# Patient Record
Sex: FEMALE | Race: WHITE
[De-identification: names, ages, dates, MRNs, and addresses within clinical notes are randomized per-mention and may not be internally consistent; named-entity substitution may affect disease eponyms.]

---

## 2019-05-30 NOTE — DIAGNOSTIC IMAGING REPORT
EXAMINATION:  CHEST 2 VIEWS    



INDICATION: Pre-admit. 



COMPARISON:  Chest radiograph 1/25/2014. 

     

FINDINGS:

TUBES and LINES:  Left-sided AICD device with leads overlying the right atrium,

coronary sinus, and right ventricle.



LUNGS:  Lungs are well inflated. A 7 mm nodular opacity projects over the right

midlung.  There is no evidence of pneumonia or pulmonary edema. Mild patchy

right basilar opacity, likely atelectasis.



PLEURA:  No pleural effusion or pneumothorax. 



HEART AND MEDIASTINUM:  The cardiomediastinal silhouette is unremarkable. There

are atherosclerotic calcifications within the aorta.



BONES AND SOFT TISSUES:  No acute osseous abnormality. 



UPPER ABDOMEN: No free air under the diaphragm.    



IMPRESSION: 

No acute radiographic abnormality. 



A 7 mm nodular opacity projects over the right midlung. Suggest chest CT for

further evaluation.



Signed by: Dr. Maria Luisa Chacon MD on 5/30/2019 11:18 AM

## 2019-05-31 LAB
BASOPHILS # BLD AUTO: 0 10*3/UL (ref 0–0.1)
BASOPHILS NFR BLD AUTO: 0.8 % (ref 0–1)
DEPRECATED NEUTROPHILS # BLD AUTO: 3.2 10*3/UL (ref 2.1–6.9)
EOSINOPHIL # BLD AUTO: 0.2 10*3/UL (ref 0–0.4)
EOSINOPHIL NFR BLD AUTO: 3 % (ref 0–6)
ERYTHROCYTE [DISTWIDTH] IN CORD BLOOD: 13.5 % (ref 11.7–14.4)
HCT VFR BLD AUTO: 39 % (ref 34.2–44.1)
HGB BLD-MCNC: 12.9 G/DL (ref 12–16)
LYMPHOCYTES # BLD: 1.2 10*3/UL (ref 1–3.2)
LYMPHOCYTES NFR BLD AUTO: 24.7 % (ref 18–39.1)
MCH RBC QN AUTO: 29.6 PG (ref 28–32)
MCHC RBC AUTO-ENTMCNC: 33.1 G/DL (ref 31–35)
MCV RBC AUTO: 89.4 FL (ref 81–99)
MONOCYTES # BLD AUTO: 0.3 10*3/UL (ref 0.2–0.8)
MONOCYTES NFR BLD AUTO: 6 % (ref 4.4–11.3)
NEUTS SEG NFR BLD AUTO: 65.1 % (ref 38.7–80)
PLATELET # BLD AUTO: 165 X10E3/UL (ref 140–360)
RBC # BLD AUTO: 4.36 X10E6/UL (ref 3.6–5.1)

## 2019-06-03 ENCOUNTER — HOSPITAL ENCOUNTER (OUTPATIENT)
Dept: HOSPITAL 88 - OR | Age: 79
Setting detail: OBSERVATION
LOS: 1 days | Discharge: HOME | End: 2019-06-04
Attending: SPECIALIST | Admitting: SPECIALIST
Payer: MEDICARE

## 2019-06-03 VITALS — WEIGHT: 151.5 LBS | HEIGHT: 65 IN | BODY MASS INDEX: 25.24 KG/M2

## 2019-06-03 VITALS — SYSTOLIC BLOOD PRESSURE: 168 MMHG | DIASTOLIC BLOOD PRESSURE: 81 MMHG

## 2019-06-03 VITALS — SYSTOLIC BLOOD PRESSURE: 146 MMHG | DIASTOLIC BLOOD PRESSURE: 69 MMHG

## 2019-06-03 VITALS — SYSTOLIC BLOOD PRESSURE: 178 MMHG | DIASTOLIC BLOOD PRESSURE: 84 MMHG

## 2019-06-03 VITALS — DIASTOLIC BLOOD PRESSURE: 84 MMHG | SYSTOLIC BLOOD PRESSURE: 178 MMHG

## 2019-06-03 VITALS — SYSTOLIC BLOOD PRESSURE: 153 MMHG | DIASTOLIC BLOOD PRESSURE: 71 MMHG

## 2019-06-03 DIAGNOSIS — M17.11: Primary | ICD-10-CM

## 2019-06-03 DIAGNOSIS — Z01.812: ICD-10-CM

## 2019-06-03 DIAGNOSIS — Z01.811: ICD-10-CM

## 2019-06-03 DIAGNOSIS — Z82.49: ICD-10-CM

## 2019-06-03 DIAGNOSIS — Z83.3: ICD-10-CM

## 2019-06-03 DIAGNOSIS — M16.0: ICD-10-CM

## 2019-06-03 DIAGNOSIS — Z88.0: ICD-10-CM

## 2019-06-03 DIAGNOSIS — Z82.3: ICD-10-CM

## 2019-06-03 DIAGNOSIS — Z86.73: ICD-10-CM

## 2019-06-03 DIAGNOSIS — I10: ICD-10-CM

## 2019-06-03 DIAGNOSIS — Z79.82: ICD-10-CM

## 2019-06-03 DIAGNOSIS — E11.9: ICD-10-CM

## 2019-06-03 DIAGNOSIS — I25.10: ICD-10-CM

## 2019-06-03 PROCEDURE — 86920 COMPATIBILITY TEST SPIN: CPT

## 2019-06-03 PROCEDURE — 97162 PT EVAL MOD COMPLEX 30 MIN: CPT

## 2019-06-03 PROCEDURE — 86850 RBC ANTIBODY SCREEN: CPT

## 2019-06-03 PROCEDURE — 85018 HEMOGLOBIN: CPT

## 2019-06-03 PROCEDURE — 86900 BLOOD TYPING SEROLOGIC ABO: CPT

## 2019-06-03 PROCEDURE — 71046 X-RAY EXAM CHEST 2 VIEWS: CPT

## 2019-06-03 PROCEDURE — 85014 HEMATOCRIT: CPT

## 2019-06-03 PROCEDURE — 85025 COMPLETE CBC W/AUTO DIFF WBC: CPT

## 2019-06-03 PROCEDURE — 73560 X-RAY EXAM OF KNEE 1 OR 2: CPT

## 2019-06-03 PROCEDURE — 97139 UNLISTED THERAPEUTIC PX: CPT

## 2019-06-03 PROCEDURE — 27447 TOTAL KNEE ARTHROPLASTY: CPT

## 2019-06-03 PROCEDURE — 36415 COLL VENOUS BLD VENIPUNCTURE: CPT

## 2019-06-03 PROCEDURE — 97530 THERAPEUTIC ACTIVITIES: CPT

## 2019-06-03 PROCEDURE — 97116 GAIT TRAINING THERAPY: CPT

## 2019-06-03 RX ADMIN — Medication SCH MG: at 17:01

## 2019-06-03 RX ADMIN — ACETAMINOPHEN SCH MG: 10 INJECTION, SOLUTION INTRAVENOUS at 18:48

## 2019-06-03 RX ADMIN — SODIUM CHLORIDE SCH MLS/HR: 9 INJECTION, SOLUTION INTRAVENOUS at 19:24

## 2019-06-03 RX ADMIN — VANCOMYCIN HYDROCHLORIDE SCH MLS/HR: 1 INJECTION, SOLUTION INTRAVENOUS at 17:02

## 2019-06-03 RX ADMIN — ACETAMINOPHEN SCH MG: 10 INJECTION, SOLUTION INTRAVENOUS at 11:55

## 2019-06-03 RX ADMIN — CELECOXIB SCH MG: 100 CAPSULE ORAL at 17:02

## 2019-06-03 RX ADMIN — SODIUM CHLORIDE SCH MLS/HR: 9 INJECTION, SOLUTION INTRAVENOUS at 11:54

## 2019-06-03 RX ADMIN — ACETAMINOPHEN SCH MG: 10 INJECTION, SOLUTION INTRAVENOUS at 23:07

## 2019-06-03 NOTE — XMS REPORT
Oh Wilson MD

                             Created on: 2018



GISELLE DELGADO

External Reference #: [id number]

: 1940

Sex: Female



Demographics







                          Address                   [address] [place]  [address]

 

                          Home Phone                [phone]

 

                          Preferred Language        Unknown

 

                          Marital Status            Unknown

 

                          Alevism Affiliation     Unknown

 

                          Race                      Unknown

 

                          Ethnic Group              UNK





Author







                          Author                    Oh Wilson

 

                          Organization              [employer]

 

                          Address                   Unknown

 

                          Phone                     Unavailable







Care Team Providers







                    Care Team Member Name    Role                Phone

 

                    Oh Wilson     CP                  Unavailable



                                                                



Allergies

          No Known Allergies                                                    
                                   



Problems

          





             Problem Type    Condition    Code         Onset Dates    Condition Status

 

             Problem      Vitamin D deficiency    E55.9                     Active

 

             Problem      Primary osteoarthritis involving multiple joints    M15.0                     Active

 

             Problem      Pain in right knee    M25.561                   Active



                                                                                
                           



Medications

          No Known Medications                                                  
                           



Results

          No Known Results                                                      
             



Summary Purpose

          [employer] Submission

## 2019-06-03 NOTE — NUR
Rounding done & report received. Patient is resting in bed, awake & alert. Respirations even 
& unlabored, no distress noted. IV fluids running to L FA, no infiltration noted. Patient 
has Kerlex dressing to RLE, w/ heel placed on pillow, ice pack in place. Patient denies any 
issues or needs at this time. Call light within reach, bed set to lowest position & side 
rails x2 raised.

## 2019-06-03 NOTE — XMS REPORT
Oh Wilson MD

                             Created on: 2018



GISELLE DELGADO

External Reference #: [id number]

: 1940

Sex: Female



Demographics







                          Address                   [address] [place]  [address]

 

                          Home Phone                [phone]

 

                          Preferred Language        Unknown

 

                          Marital Status            Unknown

 

                          Voodoo Affiliation     Unknown

 

                          Race                      Unknown

 

                          Ethnic Group              UNK





Author







                          Author                    Oh Wilson

 

                          Organization              [employer]

 

                          Address                   Unknown

 

                          Phone                     Unavailable







Care Team Providers







                    Care Team Member Name    Role                Phone

 

                    Oh Wilson     CP                  Unavailable



                                                                



Allergies

          No Known Allergies                                                    
                                   



Problems

          





             Problem Type    Condition    Code         Onset Dates    Condition Status

 

             Problem      Vitamin D deficiency    E55.9                     Active

 

             Problem      Primary osteoarthritis involving multiple joints    M15.0                     Active

 

             Problem      Pain in right knee    M25.561                   Active



                                                                                
                           



Medications

          No Known Medications                                                  
                           



Results

          No Known Results                                                      
             



Summary Purpose

          [employer] Submission

## 2019-06-03 NOTE — XMS REPORT
Summary of Care: 7/10/14 - 7/10/14

                             Created on: 2042



GISELLE DELGADO

External Reference #: [id number]

: 1940

Sex: Female



Demographics







                          Address                   [address] [place] [address] [place]  [address]

 

                          Home Phone                [phone]

 

                          Preferred Language        English

 

                          Marital Status            Single

 

                          Restorationism Affiliation     Yazidism

 

                          Race                      White/

 

                          Ethnic Group              Non-





Author







                          Organization              Unknown

 

                          Address                   Unknown

 

                          Phone                     Unavailable







Encounter





HQ Rashardr_rambo(GINO) [id number] Date(s): 7/10/14 - 7/10/14

[facility] - [address] [address]-     [place] [phone]

Discharge Disposition: Home

Physician Attending: Juan Hernandez MD





Reason for Visit





715.00 - GENERAL OSTEOAR



Problem List





No data available for this section



Allergies, Adverse Reactions, Alerts





No data available for this section



Medications





No data available for this section



Medications Administered During Your Visit





No data available for this section



Immunizations





No data available for this section

## 2019-06-03 NOTE — XMS REPORT
Patient Summary Document

                             Created on: 2019



GISELLE DELGADO

External Reference #: [id number]

: 1940

Sex: Female



Demographics







                          Address                   [address] [place] [address]

 

                          Home Phone                [phone]

 

                          Preferred Language        Unknown

 

                          Marital Status            Unknown

 

                          Presybeterian Affiliation     Unknown

 

                          Race                      Unknown

 

                                        Additional Race(s)  

 

                          Ethnic Group              Unknown





Author







                          Author                    [facility]

 

                          [facility]

 

                          Address                   Unknown

 

                          Phone                     Unavailable







Care Team Providers







                    Care Team Member Name    Role                Phone

 

                    VIRGIL ARIZA     Unavailable         Unavailable







Problems

This patient has no known problems.



Allergies, Adverse Reactions, Alerts

This patient has no known allergies or adverse reactions.



Medications

This patient has no known medications.



Results







           Test Description    Test Time    Test Comments    Text Results    Atomic Results    Result

 Comments

 

                CHEST 2 VIEWS    2019 10:56:00                                                             

                                             [place] [address]  
   Patient Name: GISELLE DELGADO                                   MR #: 
[id number]                     : 1940                                  
Age/Sex: 79/F  Acct #: [id number]                              Req #: [id number]
[id number]  Adm Physician:                                                      
Ordered by: VIRGIL ARIZA MD                            Report #: [id number]    
   Location: OR                                      Room/Bed:                  
  
___________________________________________________________________________________________________
   Procedure: [id number] DX/CHEST 2 VIEWS  Exam Date: 19                   
        Exam Time: 1025                                              REPORT 
STATUS: Signed    EXAMINATION:  CHEST 2 VIEWS          INDICATION: Pre-admit.   
   COMPARISON:  Chest radiograph 2014.            FINDINGS:   TUBES and 
LINES:  Left-sided AICD device with leads overlying the right atrium,   coronary
sinus, and right ventricle.      LUNGS:  Lungs are well inflated. A 7 mm nodular
opacity projects over the right   midlung.  There is no evidence of pneumonia or
pulmonary edema. Mild patchy   right basilar opacity, likely atelectasis.      
PLEURA:  No pleural effusion or pneumothorax.       HEART AND MEDIASTINUM:  The 
cardiomediastinal silhouette is unremarkable. There   are atherosclerotic 
calcifications within the aorta.      BONES AND SOFT TISSUES:  No acute osseous 
abnormality.       UPPER ABDOMEN: No free air under the diaphragm.          
IMPRESSION:    No acute radiographic abnormality.       A 7 mm nodular opacity 
projects over the right midlung. Suggest chest CT for   further evaluation.     
Signed by: Dr. Sunita Packer MD on 2019 11:18 AM        Dictated By: SUNITA PACKER MD  Electronically Signed By: SUNITA PACKER MD on 19 1118  Transcribed 
By: KEVIN on 19 1118       COPY TO:   VIRGIL ARIZA MD

## 2019-06-03 NOTE — XMS REPORT
Summary of Care: 3/15/17 - 3/15/17

                             Created on: 2095



DANNYGISELLE

External Reference #: [id number]

: 1940

Sex: Female



Demographics







                          Address                   [address] [place] [address] [place]  [address]

 

                          Home Phone                [phone]

 

                          Preferred Language        English

 

                          Marital Status            Single

 

                          Sabianist Affiliation     Samaritan

 

                          Race                      White/

 

                          Ethnic Group              Non-





Author







                          Author                    [facility]

 

                          Organization              [facility]

 

                          Address                   Unknown

 

                          Phone                     Unavailable







Encounter





HQ Encntr_alias(FIN) [id number] Date(s): 3/15/17 - 3/15/17

[facility] [address] [place] [address]      [phone]
[phone]

Discharge Disposition: Home or Self Care

Attending Physician: Coy Perez MD





Vital Signs





No data available for this section



Problem List





No data available for this section



Allergies, Adverse Reactions, Alerts





No data available for this section



Medications





No data available for this section



Results





No data available for this section



Immunizations





No data available for this section



Procedures





No data available for this section



Social History





No data available for this section



Assessment and Plan





No data available for this section

## 2019-06-03 NOTE — XMS REPORT
Oh Wilson MD

                             Created on: 2018



GISELLE DELGADO

External Reference #: [id number]

: 1940

Sex: Female



Demographics







                          Address                   [address] 

[place]  [address]

 

                          Home Phone                [phone]

 

                          Preferred Language        Unknown

 

                          Marital Status            Unknown

 

                          Christianity Affiliation     Unknown

 

                          Race                      Unknown

 

                          Ethnic Group              UNK





Author







                          Author                    Oh Wilson

 

                          Organization              [employer]

 

                          Address                   Unknown

 

                          Phone                     Unavailable







Care Team Providers







                    Care Team Member Name    Role                Phone

 

                    Oh Wilson     CP                  Unavailable



                                                                



Allergies, Adverse Reactions, Alerts

          





                    Substance           Reaction            Event Type

 

                    penicillin          Info Not Available    Drug Allergy



                                                                                
       



Problems

          





             Problem Type    Condition    Code         Onset Dates    Condition Status

 

             Problem      Vitamin D deficiency    E55.9                     Active

 

             Problem      Primary osteoarthritis involving multiple joints    M15.0                     Active

 

             Problem      Pain in right knee    M25.561                   Active

 

             Assessment    Pain in right knee    M25.561                   Active

 

             Assessment    Primary osteoarthritis involving multiple joints    M15.0                     Active



                                                                                
                                               



Medications

          





        Medication    Code System    Code    Instructions    Start Date    End Date    Status    Dosage



 

        Centrum    NDC     08231899479    - Orally                     Active    as directed

 

        Osteo Bi-Flex Adv Double St    NDC     42745824656    - Orally                     Active    as directed



 

        Losartan Potassium    NDC     61936539632    50 MG Orally Once a day                    Active    1 tablet



 

        Pantoprazole Sodium    NDC     10594065796    40 MG Orally Once a day                    Active    1 

tablet

 

          Tylenol Extra Strength    NDC       27837367112    500 MG Orally every 6 hrs                        Active

                                        1 tablet as needed

 

        Lisinopril    NDC     96043845224    2.5 MG Orally Once a day                    Active    1 tablet

 

        Metoprolol Tartrate    NDC     41784160977    25 MG Orally Twice a day                    Active    1

 tablet with food

 

        Pravastatin Sodium    NDC     39590563995    10 MG Orally Once a day                    Active    1 tablet





                                                                                
                                                                                
      



Vital Signs

          





                          Date/Time:                Dec 06, 2018

 

                          BMI                       25.22 Index

 

                          Weight                    151.6 lbs

 

                          Height                    65 in

 

                          Cardiac Monitoring Heart Rate    74 /min

 

                          Blood Pressure Diastolic    80 mm Hg

 

                          Blood Pressure Systolic    130 mm Hg



                                                                              



Results

          No Known Results                                                      
             



Summary Purpose

          [employer] Submission

## 2019-06-03 NOTE — DIAGNOSTIC IMAGING REPORT
Right knee radiographs-2 views



History: Postoperative



Findings: Status post right total knee arthroplasty and patellar resurfacing

with prosthetic components in anatomic alignment.  Overlying subcutaneous

emphysema and surgical skin staples are present. No evidence of acute fracture

or malalignment.



IMPRESSION:

Status post right total knee arthroplasty and anatomic position.



Signed by: Dr. Maria Luisa Chacon MD on 6/3/2019 12:22 PM

## 2019-06-03 NOTE — OPERATIVE REPORT
DATE OF PROCEDURE:  06/03/2019

 

SURGEON:  Kobi Mejía MD

 

ASSISTANT:  Kuldeep Pineda

 

PREOPERATIVE DIAGNOSIS:  Osteoarthritis, right knee.

 

POSTOPERATIVE DIAGNOSIS:  Osteoarthritis, right knee.

 

PROCEDURE:  Right total knee arthroplasty.

 

INDICATIONS:  The patient is an active 79-year-old woman, who has end-stage arthritis in

her right knee.  She has failed conservative management and would like to proceed with a

right total knee replacement.  The risks and benefits of the procedure have been

discussed.  The added challenges to recover from the surgery due to her age have been

discussed.  She states she understands and wishes to proceed procedure. 

 

DESCRIPTION OF PROCEDURE:  The patient was brought to the operating room and placed

under general anesthetic.  She received prophylactic antibiotics, a regional block and

tranexamic acid in the holding area.  Her right lower extremity was prepped and draped

in a sterile manner.  A preoperative time-out was performed.  The extremity was

exsanguinated and a proximal tourniquet was inflated to 300 mmHg.  An anterior approach

to the right knee was made.  Her skin was very thin.  A medial parapatellar arthrotomy

was performed.  Clear synovial fluid was removed from the joint.  Soft tissue releases

were performed to bring the knee up into flexion with the patella everted.  Meniscal

remnants and marginal osteophytes were removed.  The cruciate ligaments were sacrificed.

 A [employer] and [employer] knee system was used with ultracongruent tibial inserts.  An

extramedullary cutting guide was used to resect the proximal tibia.  The cut was

referenced off the least affected lateral compartment.  The tibial base plate was a size

#4.  The central fin punch was impacted and attention was directed towards the distal

femur.  An intramedullary cutting guide was used to resect the distal femur in 6 degrees

of valgus and rotation referencing off a combination of landmarks including Whitesides

line, the epicondylar axis and the posterior condyles.  The femoral component was size

#5.  The anterior and posterior cuts were made.  Trial reduction was performed.  A 9 mm

ultracongruent tibial insert provided appropriate soft tissue balancing in both flexion

and extension.  The patella was resurfaced with a 29 mm x 7.5 mm patellar button.  The

thickness was checked before and after resurfacing, was right around 22 mm.  Patellar

tracking was noted to be concentric.  The trial implants were removed.  A 100 mL

premixed pericapsular FREDY injection was placed into the surrounding soft tissue.  The

knee was thoroughly irrigated with a shower tip pulsatile lavage.  The components were

cemented into place using a single mix of Palacos cement preloaded with antibiotics.

Care was taken to remove all extravasated cement.  The wound was further irrigated,

while the cement cured.  Vancomycin powder 500 mg was then placed into the joint.  The

arthrotomy was carefully closed with interrupted #1 Ethibond.  The knee was put through

flexion and extension to ensure a secure closure.  The skin was carefully closed with

subcuticular Vicryl and staples.  As noted previously, the skin was very thin.  A

sterile bandage was applied.  The 

patient was extubated and transported to the recovery room in stable condition.  Blood

loss was minimal.  All needle and sponge counts were correct. 

 

 

 

 

______________________________

Kobi Mejía MD DR/QUINTIN

D:  06/03/2019 10:35:45

T:  06/03/2019 17:25:56

Job #:  [id number]

## 2019-06-04 VITALS — DIASTOLIC BLOOD PRESSURE: 84 MMHG | SYSTOLIC BLOOD PRESSURE: 178 MMHG

## 2019-06-04 VITALS — DIASTOLIC BLOOD PRESSURE: 73 MMHG | SYSTOLIC BLOOD PRESSURE: 162 MMHG

## 2019-06-04 VITALS — SYSTOLIC BLOOD PRESSURE: 162 MMHG | DIASTOLIC BLOOD PRESSURE: 73 MMHG

## 2019-06-04 VITALS — SYSTOLIC BLOOD PRESSURE: 167 MMHG | DIASTOLIC BLOOD PRESSURE: 76 MMHG

## 2019-06-04 VITALS — DIASTOLIC BLOOD PRESSURE: 82 MMHG | SYSTOLIC BLOOD PRESSURE: 194 MMHG

## 2019-06-04 VITALS — DIASTOLIC BLOOD PRESSURE: 71 MMHG | SYSTOLIC BLOOD PRESSURE: 187 MMHG

## 2019-06-04 LAB
HCT VFR BLD AUTO: 30.6 % (ref 34.2–44.1)
HGB BLD-MCNC: 10.1 G/DL (ref 12–16)

## 2019-06-04 RX ADMIN — CELECOXIB SCH MG: 100 CAPSULE ORAL at 08:50

## 2019-06-04 RX ADMIN — Medication SCH MG: at 08:50

## 2019-06-04 RX ADMIN — ACETAMINOPHEN SCH MG: 10 INJECTION, SOLUTION INTRAVENOUS at 08:50

## 2019-06-04 RX ADMIN — SODIUM CHLORIDE SCH MLS/HR: 9 INJECTION, SOLUTION INTRAVENOUS at 04:30

## 2019-06-04 RX ADMIN — VANCOMYCIN HYDROCHLORIDE SCH MLS/HR: 1 INJECTION, SOLUTION INTRAVENOUS at 04:33

## 2019-06-04 NOTE — CONSULTATION
DATE OF CONSULTATION:    

 

CHIEF COMPLAINT:  79-year-old female, status post right knee open arthroplasty.

 

HISTORY OF PRESENT ILLNESS:  This is an 79-year-old woman with a history of coronary

artery disease, was operated yesterday for right knee replacement.  The patient is

currently stable.  Pain is controlled.  No chest pain noted.  The patient has history of

CAD.  The patient risks was considered as average cardiac risks.  The patient underwent

surgery. 

 

MEDICATIONS:  At this time include aspirin, lisinopril 2.5, losartan 100 mg

pantoprazole, pravastatin, and Tylenol as needed.  Currently, the patient is also

received Ketorolac, promethazine, and zolpidem for sleep. 

 

PAST MEDICAL HISTORY:  As mentioned above, hypertension, CAD, hyperlipidemia, and

history of osteoarthritis. 

 

PAST SURGICAL HISTORY:  Noncontributory.  The patient did have recent stress test which

was nondiagnostic.  LV function was abnormal.  The patient was cleared for surgery. 

 

REVIEW OF SYSTEMS:

At this time.  No chest pain or shortness of breath.  No nausea, vomiting, or diarrhea.

No constipation.  No rectal bleeding.  Positive for pain in the right lower extremity. 

 

PHYSICAL EXAMINATION:

VITAL SIGNS:  Temperature 97.3, pulse of 79, respirations 18, and blood pressure is

167/76. 

HEENT:  Normocephalic and atraumatic.  No pallor noted.  No icterus noted. 

CVS:  S1 and S2 normal.  Regular rate and rhythm. 

ABDOMEN:  Nontender and nondistended. 

EXTREMITIES:  No clubbing, no cyanosis, no edema.  Right knee in a CPM.

ASSESSMENT:  79-year-old female with coronary artery disease, status post right knee

arthroplasty. 

 

PLAN:  Plan is to continue on her beta-blockade and ACE inhibitors with a history of LV

function dysfunction and beta-blockade, continue with this.  Aspirin is instituted for

DVT prophylaxis.  Medications were reviewed.  Statins will be reinstated and further

recommendation per clinical course.  We will continue monitoring the patient and

continue with postoperative medications and orders as per Orthopedics. 

 

 

 

 

______________________________

MD GALEN Dennis/MODL

D:  06/04/2019 06:18:03

T:  06/04/2019 07:07:54

Job #:  [id number]

## 2019-06-04 NOTE — NUR
EDWINA called and spoke with Kerri with intake at [facility]. Informed her of anticipated 
discharge for today. She said they will be able to see pt tomorrow. 

Phone [phone]

Fax [phone]



Called and spoke to Dionne at [employer]. She states they have spoken to pt. She 
already has a walker and refused [employer]. They will deliver the CPM today.

Phone [phone]



Both company's contact information was printed and given to pt.

## 2019-06-30 ENCOUNTER — HOSPITAL ENCOUNTER (INPATIENT)
Dept: HOSPITAL 88 - ER | Age: 79
LOS: 4 days | Discharge: HOME | DRG: 947 | End: 2019-07-04
Attending: FAMILY MEDICINE | Admitting: FAMILY MEDICINE
Payer: MEDICARE

## 2019-06-30 VITALS — BODY MASS INDEX: 23.72 KG/M2 | HEIGHT: 67 IN | WEIGHT: 151.13 LBS

## 2019-06-30 VITALS — DIASTOLIC BLOOD PRESSURE: 78 MMHG | SYSTOLIC BLOOD PRESSURE: 174 MMHG

## 2019-06-30 VITALS — DIASTOLIC BLOOD PRESSURE: 123 MMHG | SYSTOLIC BLOOD PRESSURE: 157 MMHG

## 2019-06-30 VITALS — SYSTOLIC BLOOD PRESSURE: 168 MMHG | DIASTOLIC BLOOD PRESSURE: 74 MMHG

## 2019-06-30 VITALS — SYSTOLIC BLOOD PRESSURE: 100 MMHG | DIASTOLIC BLOOD PRESSURE: 78 MMHG

## 2019-06-30 VITALS — SYSTOLIC BLOOD PRESSURE: 166 MMHG | DIASTOLIC BLOOD PRESSURE: 87 MMHG

## 2019-06-30 VITALS — SYSTOLIC BLOOD PRESSURE: 171 MMHG | DIASTOLIC BLOOD PRESSURE: 77 MMHG

## 2019-06-30 DIAGNOSIS — F02.80: ICD-10-CM

## 2019-06-30 DIAGNOSIS — N17.9: ICD-10-CM

## 2019-06-30 DIAGNOSIS — G92: ICD-10-CM

## 2019-06-30 DIAGNOSIS — R41.82: Primary | ICD-10-CM

## 2019-06-30 DIAGNOSIS — F41.9: ICD-10-CM

## 2019-06-30 DIAGNOSIS — Z96.651: ICD-10-CM

## 2019-06-30 DIAGNOSIS — I11.0: ICD-10-CM

## 2019-06-30 DIAGNOSIS — R74.0: ICD-10-CM

## 2019-06-30 DIAGNOSIS — R73.03: ICD-10-CM

## 2019-06-30 DIAGNOSIS — E78.5: ICD-10-CM

## 2019-06-30 DIAGNOSIS — Z88.0: ICD-10-CM

## 2019-06-30 DIAGNOSIS — R47.89: ICD-10-CM

## 2019-06-30 DIAGNOSIS — G30.9: ICD-10-CM

## 2019-06-30 DIAGNOSIS — Z95.0: ICD-10-CM

## 2019-06-30 DIAGNOSIS — D64.9: ICD-10-CM

## 2019-06-30 DIAGNOSIS — I50.9: ICD-10-CM

## 2019-06-30 LAB
ALBUMIN SERPL-MCNC: 4.1 G/DL (ref 3.5–5)
ALBUMIN/GLOB SERPL: 1.2 {RATIO} (ref 0.8–2)
ALP SERPL-CCNC: 64 IU/L (ref 40–150)
ALT SERPL-CCNC: 13 IU/L (ref 0–55)
ANION GAP SERPL CALC-SCNC: 21.2 MMOL/L (ref 8–16)
BACTERIA URNS QL MICRO: (no result) /HPF
BASOPHILS # BLD AUTO: 0 10*3/UL (ref 0–0.1)
BASOPHILS NFR BLD AUTO: 0.5 % (ref 0–1)
BILIRUB UR QL: NEGATIVE
BUN SERPL-MCNC: 14 MG/DL (ref 7–26)
BUN/CREAT SERPL: 10 (ref 6–25)
CALCIUM SERPL-MCNC: 11.4 MG/DL (ref 8.4–10.2)
CHLORIDE SERPL-SCNC: 98 MMOL/L (ref 98–107)
CK MB SERPL-MCNC: 0.8 NG/ML (ref 0–5)
CK MB SERPL-MCNC: 1.1 NG/ML (ref 0–5)
CK SERPL-CCNC: 34 IU/L (ref 29–168)
CK SERPL-CCNC: 44 IU/L (ref 29–168)
CLARITY UR: CLEAR
CO2 SERPL-SCNC: 25 MMOL/L (ref 22–29)
COLOR UR: YELLOW
DEPRECATED APTT PLAS QN: 33.4 SECONDS (ref 23.8–35.5)
DEPRECATED INR PLAS: 1.07
DEPRECATED NEUTROPHILS # BLD AUTO: 6.4 10*3/UL (ref 2.1–6.9)
DEPRECATED RBC URNS MANUAL-ACNC: (no result) /HPF (ref 0–5)
EGFRCR SERPLBLD CKD-EPI 2021: 37 ML/MIN (ref 60–?)
EOSINOPHIL # BLD AUTO: 0.1 10*3/UL (ref 0–0.4)
EOSINOPHIL NFR BLD AUTO: 1.2 % (ref 0–6)
EPI CELLS URNS QL MICRO: (no result) /LPF
ERYTHROCYTE [DISTWIDTH] IN CORD BLOOD: 13.1 % (ref 11.7–14.4)
GLOBULIN PLAS-MCNC: 3.5 G/DL (ref 2.3–3.5)
GLUCOSE SERPLBLD-MCNC: 107 MG/DL (ref 74–118)
HCT VFR BLD AUTO: 35.8 % (ref 34.2–44.1)
HGB BLD-MCNC: 11.9 G/DL (ref 12–16)
KETONES UR QL STRIP.AUTO: NEGATIVE
LEUKOCYTE ESTERASE UR QL STRIP.AUTO: NEGATIVE
LYMPHOCYTES # BLD: 1.5 10*3/UL (ref 1–3.2)
LYMPHOCYTES NFR BLD AUTO: 17.4 % (ref 18–39.1)
MCH RBC QN AUTO: 29 PG (ref 28–32)
MCHC RBC AUTO-ENTMCNC: 33.2 G/DL (ref 31–35)
MCV RBC AUTO: 87.1 FL (ref 81–99)
MONOCYTES # BLD AUTO: 0.6 10*3/UL (ref 0.2–0.8)
MONOCYTES NFR BLD AUTO: 6.4 % (ref 4.4–11.3)
NEUTS SEG NFR BLD AUTO: 74 % (ref 38.7–80)
NITRITE UR QL STRIP.AUTO: NEGATIVE
NON-SQ EPI CELLS URNS QL MICRO: (no result)
PLATELET # BLD AUTO: 232 X10E3/UL (ref 140–360)
POTASSIUM SERPL-SCNC: 4.2 MMOL/L (ref 3.5–5.1)
PROT UR QL STRIP.AUTO: NEGATIVE
PROTHROMBIN TIME: 14.4 SECONDS (ref 11.9–14.5)
RBC # BLD AUTO: 4.11 X10E6/UL (ref 3.6–5.1)
SODIUM SERPL-SCNC: 140 MMOL/L (ref 136–145)
SP GR UR STRIP: <=1.005 (ref 1.01–1.02)
UROBILINOGEN UR STRIP-MCNC: 0.2 MG/DL (ref 0.2–1)
WBC #/AREA URNS HPF: (no result) /HPF (ref 0–5)

## 2019-06-30 PROCEDURE — 85025 COMPLETE CBC W/AUTO DIFF WBC: CPT

## 2019-06-30 PROCEDURE — 87040 BLOOD CULTURE FOR BACTERIA: CPT

## 2019-06-30 PROCEDURE — 82553 CREATINE MB FRACTION: CPT

## 2019-06-30 PROCEDURE — 81001 URINALYSIS AUTO W/SCOPE: CPT

## 2019-06-30 PROCEDURE — 99284 EMERGENCY DEPT VISIT MOD MDM: CPT

## 2019-06-30 PROCEDURE — 80048 BASIC METABOLIC PNL TOTAL CA: CPT

## 2019-06-30 PROCEDURE — 71260 CT THORAX DX C+: CPT

## 2019-06-30 PROCEDURE — 93005 ELECTROCARDIOGRAM TRACING: CPT

## 2019-06-30 PROCEDURE — 70496 CT ANGIOGRAPHY HEAD: CPT

## 2019-06-30 PROCEDURE — 85730 THROMBOPLASTIN TIME PARTIAL: CPT

## 2019-06-30 PROCEDURE — 93970 EXTREMITY STUDY: CPT

## 2019-06-30 PROCEDURE — 82607 VITAMIN B-12: CPT

## 2019-06-30 PROCEDURE — 94640 AIRWAY INHALATION TREATMENT: CPT

## 2019-06-30 PROCEDURE — 85379 FIBRIN DEGRADATION QUANT: CPT

## 2019-06-30 PROCEDURE — 82550 ASSAY OF CK (CPK): CPT

## 2019-06-30 PROCEDURE — 82140 ASSAY OF AMMONIA: CPT

## 2019-06-30 PROCEDURE — 87086 URINE CULTURE/COLONY COUNT: CPT

## 2019-06-30 PROCEDURE — 97139 UNLISTED THERAPEUTIC PX: CPT

## 2019-06-30 PROCEDURE — 93306 TTE W/DOPPLER COMPLETE: CPT

## 2019-06-30 PROCEDURE — 70498 CT ANGIOGRAPHY NECK: CPT

## 2019-06-30 PROCEDURE — 84443 ASSAY THYROID STIM HORMONE: CPT

## 2019-06-30 PROCEDURE — 86592 SYPHILIS TEST NON-TREP QUAL: CPT

## 2019-06-30 PROCEDURE — 70450 CT HEAD/BRAIN W/O DYE: CPT

## 2019-06-30 PROCEDURE — 83605 ASSAY OF LACTIC ACID: CPT

## 2019-06-30 PROCEDURE — 83880 ASSAY OF NATRIURETIC PEPTIDE: CPT

## 2019-06-30 PROCEDURE — 74230 X-RAY XM SWLNG FUNCJ C+: CPT

## 2019-06-30 PROCEDURE — 36415 COLL VENOUS BLD VENIPUNCTURE: CPT

## 2019-06-30 PROCEDURE — 84484 ASSAY OF TROPONIN QUANT: CPT

## 2019-06-30 PROCEDURE — 85610 PROTHROMBIN TIME: CPT

## 2019-06-30 PROCEDURE — 80053 COMPREHEN METABOLIC PANEL: CPT

## 2019-06-30 PROCEDURE — 71045 X-RAY EXAM CHEST 1 VIEW: CPT

## 2019-06-30 RX ADMIN — Medication SCH ML: at 19:55

## 2019-06-30 RX ADMIN — LORAZEPAM PRN MG: 2 INJECTION INTRAMUSCULAR; INTRAVENOUS at 19:45

## 2019-06-30 RX ADMIN — CEFEPIME HYDROCHLORIDE SCH MLS/HR: 1 INJECTION, POWDER, FOR SOLUTION INTRAMUSCULAR; INTRAVENOUS at 18:04

## 2019-06-30 NOTE — DIAGNOSTIC IMAGING REPORT
EXAM: CT Chest with contrast- Pulmonary Embolism Protocol



INDICATION: Shortness of breath, post operative. 



COMPARISON: Chest radiograph 6/30/2019.



TECHNIQUE:

Chest was scanned utilizing a multidetector helical scanner from the lung apex

through the level of the diaphragm after administration of IV contrast. Thin

section reconstructions were obtained with special concentration on the

pulmonary arteries. Coronal and sagittal reformations were obtained. Pulmonary

embolism protocol was performed.

           IV CONTRAST: 100 cc of Isovue 370

           RADIATION DOSE: Total DLP: 470.8 mGy*cm

            

Dose modulation, iterative reconstruction, and/or weight based adjustment of

the mA/kV was utilized to reduce the radiation dose to as low as reasonably

achievable. 



           COMPLICATIONS: None



FINDINGS:



LINES/ TUBES: There is a left-sided AICD device with leads terminating in the

right atrium, coronary sinus, and right ventricle.



PULMONARY ARTERIES: No filling defect is identified within the pulmonary

arteries to the segmental level. The subsegmental pulmonary arteries are not

well opacified, with limited evaluation particularly in the lower lungs

secondary to motion. Main pulmonary artery measures 2.3 cm in diameter.



LUNGS AND AIRWAYS: The central airways are patent. Limited evaluation secondary

to motion artifact. There is a 6 mm solid nodule in the right middle lobe on

series 3, image 57 and a 7 mm subpleural solid nodule in the right lower lobe

on image 52. There is a 3 mm solid nodule in the left upper lobe on image 21.

Mild biapical pleural-parenchymal opacity. 



PLEURA: The pleural spaces are clear.



HEART AND MEDIASTINUM: The thyroid gland is normal.  No mediastinal, hilar or

axillary lymphadenopathy. No cardiomegaly or pericardial effusion. Scattered

coronary and aortic atherosclerosis. Diffuse mild esophageal wall thickening.

Small hiatal hernia. The left vertebral artery arises directly from the

thoracic aorta.



UPPER ABDOMEN: Limited contrast-enhanced views of the upper abdomen. There is a

5.4 cm cyst in the right hepatic lobe (series 2, image 91; 18 HU).



BONES: No acute osseous abnormality. No suspicious lytic or blastic lesions.

Degenerative changes of the visualized spine.



SOFT TISSUES: Unremarkable.



IMPRESSION: 

No evidence of pulmonary embolism to the level of the segmental pulmonary

arteries.



Bilateral pulmonary nodules, measuring up to 7 mm in the right lower lobe.

Follow-up chest CT is recommended in 3-6 months.



Diffuse mild esophageal wall thickening which may represent esophagitis. If

clinically indicated, follow-up EGD may be considered.



Signed by: Dr. Maria Luisa Chacon MD on 6/30/2019 5:35 PM

## 2019-06-30 NOTE — DIAGNOSTIC IMAGING REPORT
History:Altered mental status



Comparison studies:

None



Technique:

Axial images were obtained from the skull base to the vertex.

Coronal and sagittal images reconstructed from the axial data.

Dose modulation, iterative reconstruction, and/or weight based adjustment 

of the mA/kV was utilized to reduce the radiation dose to as low as reasonably 

achievable.



Intravenous contrast: None



Findings:



Scalp/skull: 

No abnormalities.



Extra-axial spaces: 

No masses.  No fluid collections.



Brain sulci: Mildly prominent.

Ventricles: Mild compensatory dilatation. No hydrocephalus.



Parenchyma: 

Subtle hypodensities in the supratentorial white matter are small vessel

ischemic changes. 

No masses, hemorrhage, acute or chronic cortical vascular insults.



Sellar/suprasellar region: No abnormalities.

Craniocervical junction: Patent foramen magnum.  No Chiari one malformation.



Incidental findings:

Atherosclerotic calcifications in the carotid siphons and vertebral arteries.



Impression:



No acute abnormalities.



Chronic findings:

1.  Mild age-related generalized volume loss.

2.  Mild supratentorial white matter small vessel ischemic changes.



Signed by: Dr. Chele Mcdaniels M.D. on 6/30/2019 3:11 PM

## 2019-06-30 NOTE — XMS REPORT
Continuity of Care Document

                             Created on: 2019



GISELLE DELGADO

External Reference #: [id number]

: 1940

Sex: Female



Demographics







                          Address                   [address] [place]  [address]

 

                          Home Phone                [phone]

 

                          Preferred Language        English

 

                          Marital Status            Unknown

 

                          Gnosticist Affiliation     Unknown

 

                          Race                      Unknown

 

                          Ethnic Group              Unknown





Author







                          Author                    [employer]

 

                          Address                   Unknown

 

                          Phone                     Unavailable







Care Team Providers







                    Care Team Member Name    Role                Phone

 

                    [employer]    Unavailable         Unavailable



                                    



Problems

                    





                    Problem                            Status                            Onset Date     

                          Classification                            Date Reported       

                          Comments                            Source                    

 

                          R94.01 - ABNORMAL ELECTROENCEPHALOGRAM                            Active          

                    2017                                                           

                                                       [facility]                

    

 

                          V76.12 - SCREEN MAMMOGRA                            Active                        

                    2012                                                                         

                                                       [facility]                    

 

                    ROUTINE                            Active                            11/10/2011     

                                                                                       

                                        [facility]                    

 

                    Vitamin D deficiency                            Active                              

                          Problem                            12/15/2018              

                                                      Raymond Wilson                    

 

                          Primary osteoarthritis involving multiple joints                            Active

                                                        Problem                      

                    12/15/2018                                                        Raymond Wilson 

                   

 

                    Pain in right knee                            Active                                

                          Problem                            12/15/2018                

                                                      Raymond Wilson                    

 

                    SCREEN MAMMOGRAM NEC                            Active                              

                                                                                       

                                        [facility]                    



                                                                                
                                                                                
      



Medications

                    





                    Medication                            Details                            Route      

                          Status                            Patient Instructions         

                          Ordering Provider                            Order Date           

                                        Source                    

 

                    Centrum                            as directed                            Orally    

                          Active                            - Orally                   

                    Steve Wilson  

                  

 

                          Osteo Bi-Flex Adv Double St                            as directed                

                    Orally                            Active                            - Orally

                            Steve Wilson                    

 

                    Losartan Potassium                            1 tablet                            Orally

                            Active                            50 MG Orally Once a day

                            [place]                                                   

                                        Raymond Wilson                    

 

                    Pantoprazole Sodium                            1 tablet                            Orally

                            Active                            40 MG Orally Once a day

                            [facility]                                                   

                                        Raymond Wilson                    

 

                          Tylenol Extra Strength                            1 tablet as needed              

                    Orally                            Active                            500 MG

 Orally every 6 hrs                            Steve Wilson                    

 

                    Lisinopril                            1 tablet                            Orally    

                          Active                            2.5 MG Orally Once a day   

                         [facility]                                                        

Raymond Wilson                    

 

                          Metoprolol Tartrate                            1 tablet with food                 

                    Orally                            Active                            25 MG Orally

 Twice a day                            [place]                                       

                                        Raymond Wilson                    

 

                    Pravastatin Sodium                            1 tablet                            Orally

                            Active                            10 MG Orally Once a day

                            [facility]                                                   

                                        Raymond Wilson                    



                                                                                
                                                                                
                                  



Allergies, Adverse Reactions, Alerts

                    





                    Substance                            Category                            Reaction   

                          Severity                            Reaction type           

                          Status                            Date Reported                     

                          Comments                            Source                    

 

                    penicillin                            Adverse Reaction                            Info

 Not Available                                                        Adverse Reaction

                            Active                            2018             

                                                      Raymond Wilson                    



                                                        



Immunizations

        





                                        No Data Provided for This Section



                                     



Results







                                        No Data Provided for This Section



                    



Pathology Reports







                                        No Data Provided for This Section                    



                                                



Diagnostic Reports

                    





                    Report                            Value                            Date             

                                        Source                    

 

                          Chest w/wo contrast CT                            Exam: CT scan of the chest with 

and without contrast

Reason for Exam: Abnormal echocardiogram

Comparison Exam: None available

Technique: Multiple axial images were obtained of the chest. 3.75 mm slices were
acquired with and without injection of 100 cc Omnipaque IV. In addition, 
reformatted sagittal and coronal images were obtained for additional diagnostic 
information.  Total exam GIA=389 mGy-cm.

Discussion:

Cardiac pacemaker device is noted. Visualized portions of the thyroid gland are 
unremarkable. No mediastinal, hilar, or axillary lymphadenopathy. The heart size
is within normal limits. No pericardial or pleural effusions. The central 
airways are patent.  Central airways are patent. No interstitial thickening or 
bronchiectasis. No focal lung consolidations. Note is made of multiple 
subcentimeter pulmonary nodules. The largest is seen within the superior segment
of the right upper lobe measuring 9 mm (series 4, 43). Others are seen within 
the minor fissure (4, 47) and left major fissure (series 4, 44).

The visualized portions of the adrenal glands are within normal limits. Cyst 
seen within the liver measuring 6.1 cm. Partially seen cyst within the superior 
pole of the right kidney. No evidence for thoracic aortic aneurysm or 
dissection.

Impression:

                                        1.  The heart and mediastinum are unremarkable in appearance. No evidence for thoracic

 aortic aneurysm.

                                        2. Multiple subcentimeter pulmonary nodules as detailed above. Correlate with patient's

 clinical history and consider short-term follow-up exam.

                            03/15/2017                             ALYSHA Jackson   

                 

 

                          Spine lumbar minimum 4 views                            LUMBAR SPINE SERIES

 

CLINICAL HISTORY:

Low back pain.

 

COMPARISON IMAGING:

None.

 

FINDINGS:

Five views of the lumbar spine were obtained. Moderate multilevel degenerative 
changes are present, including loss of disc height, endplate sclerosis, marginal
osteophytes, and facet hypertrophy. Multiple neural foramina appear narrowed. 
There is approximately 14 mm of grade 2 anterolisthesis at L4-L5. No pars defect
is seen, suggesting facet arthrosis as the etiology of this finding. Vertebral 
body heights and alignment are otherwise maintained. No acute fracture is seen. 
Soft tissues are grossly unremarkable.

 

IMPRESSION:

Moderate multilevel degenerative changes with grade 2 anterolisthesis at L4-L5.

                            07/10/2014                             ALYSHA Sierraa   

                 

 

                          Knee AP and lateral                            RIGHT KNEE SERIES

 

CLINICAL HISTORY:  

Knee pain.

 

COMPARISON IMAGING: 

None.

 

FINDINGS: 

                                        2 views of the right knee are submitted for interpretation. Moderate to severe osteoarthritis

 is present, most prominent in the medial compartment. These changes include 
loss of joint space, subchondral sclerosis, and marginal osteophytes. There is 
no fracture. A large joint effusion is seen. Soft tissues are unremarkable.

 

IMPRESSION:

Moderate to severe osteoarthritis with a large joint effusion.

                            07/10/2014                             ALYSHA Jackson   

                 

 

                          Hip min 2 views                            LEFT HIP SERIES

 

CLINICAL HISTORY:

Hip pain.

 

COMPARISON IMAGING:

None.

 

FINDINGS:

Two views were submitted for evaluation. Moderate to severe loss of joint space,
large marginal osteophytes, and mild subchondral sclerosis at the left hip 
indicate moderate to severe osteoarthritis. No fracture, dislocation, or 
suspicious radiopaque foreign body is seen. Soft tissues are unremarkable.

 

IMPRESSION:

Moderate to severe osteoarthritis.

                            07/10/2014                             [facility]   

                 

 

                          Spine lumbar wo contrast MRI                            MRI LUMBAR SPINE WITHOUT CONTRAST

 2013

 

CLINICAL: Bilateral lumbar radiculopathy. 

 

TECHNIQUE: Sagittal T1, sagittal T2 with fat saturation, axial T1 and axial T2 
images were obtained. No intravenous gadolinium was given.

 

FINDINGS: 

 

The paravertebral soft tissues are normal. 

 

The conus medullaris terminates at the L1-L2 level. Multilevel lower thoracic 
spine degenerative changes are seen, with T10-T11 and T11-T12 disc bulges 
resulting in mild central canal stenosis.

 

No mass effect on the conus medullaris is present.

 

L1-L2: Mild disc bulge is present with minimal central canal stenosis. No 
foraminal stenosis is seen.

 

L2-L3: Mild to moderate disc bulge is present, measuring 3.9 mm in the AP 
dimension. Moderate facet osteoarthritis and ligamenta flava redundancy are seen
with mild to moderate central canal stenosis. No significant foraminal stenosis 
identified.

 

L3-L4: Mild disc bulge is present with mild central canal stenosis. No 
significant foraminal stenosis is seen. L4 superior endplate Schmorl's node is 
present.

 

L4-L5: Grade 1 anterolisthesis is present with disc bulge. Severe bilateral 
facet osteoarthritis is present. Significant ligamenta flava redundancy is seen.
Severe central canal stenosis is present. The lateral recesses are severely 
stenotic with impingement of the bilateral L5 descending nerve roots. Severe 
right foraminal stenosis and moderate to severe left foraminal stenosis are 
present with corresponding mass effect on the bilateral L4 exiting nerve root 
sleeves.

 

L5-S1: Disc bulge is present with mild thecal sac stenosis. Bilateral foraminal 
and extraforaminal disc osteophyte complexes are present, with moderate left 
foraminal stenosis and mild right foraminal stenosis. Mild mass effect on the 
bilateral L5 exiting nerve root sleeves is seen.

 

 

 

IMPRESSION:

                                        1. Multilevel disc degenerative disease and spondylosis.

                                        2. L2-L3 mild to moderate central canal stenosis.

                                        3. L4-L5 severe central canal stenosis an lateral recess stenosis with impingement

 of the bilateral L5 descending nerve roots. Grade 1 anterolisthesis. Moderate 
to severe bilateral foraminal stenosis as above.

                                        4. L5-S1 moderate left foraminal stenosis and mild right foraminal stenosis as above.



                                        5. Please see additional comments above.

 

                            2013                             [facility]   

                 



                                                                                
                                                   



Consultation Notes

                    





                                        No Data Provided for This Section                    



                                                            



Discharge Summaries

                    





                                        No Data Provided for This Section                    



                                                            



History and Physicals

                    





                                        No Data Provided for This Section                    



                                                                



Vital Signs

                     





                    Vital Sign                            Value                            Date         

                          Comments                            Source                    

 

                    Weight                            151.6                             2018      

                                                      [facility]                    

 

                    Height                            65                             2018         

                                                      [facility]                    

 

                    Heart Rate                            74                             2018     

                                                      [facility]                    



 

                    Diastolic (mm Hg)                            80                             2018

                                                        Ray[facility]               

     

 

                    Systolic (mm Hg)                            130                             2018

                                                        Raymond Wilson               

     



                                                                                
                                                                       



Encounters

                    





                    Location                            Location Details                            Encounter

 Type                            Encounter Number                            Reason For

 Visit                            Attending Provider                            ADM Date

                            DC Date                            Status                

                                        Source                    

 

                    [facility]                                                        Outpatient      

                          [id number]                            ROUTINE                

                    N McFall                             2011                              

                          Active                            [facility]             

       

 

                                                                        OD                            

[id number]                            V76.12 - SCREEN MAMMOGRA                   

                    NICKOLAS McFall                             2013

                            Active                             [facility]       

             

 

                          [facility] [place]                                                

                          Outpt Diag Services                            [id number]                  

                                                Juan Hernandez                             07/10/2014

                            2014                                               

                                         [facility]                    

 

                          [facility] [place]                                                

                          Outpt Diag Services                            [id number]                  

                                                Coy Perez                             03/15/2017

                            2017                                               

                                         [facility]                    



                                                                                
                           



Procedures

        





                                        No Data Provided for This Section



                                                    



Assessment and Plan

                    





                                        No Data Provided for This Section                    



                                     



Plan of Care







                                        No Data Provided for This Section                    



                                                                



Social History

                    





                    Social History                            Date                            Source    

                

 

                          No data available for this section                            2017          

                                         [facility]                    



                                                                    



Family History

                    





                                        No Data Provided for This Section                    



                                                            



Advance Directives

                    





                                        No Data Provided for This Section                    



                                                            



Functional Status

                    





                                        No Data Provided for This Section

## 2019-06-30 NOTE — XMS REPORT
Continuity of Care Document

                             Created on: 2019



GISELLE DELGADO

External Reference #: [id number]

: 1940

Sex: Female



Demographics







                          Address                   [address] [place]  [address]

 

                          Home Phone                [phone]

 

                          Preferred Language        English

 

                          Marital Status            Unknown

 

                          Taoism Affiliation     Unknown

 

                          Race                      Unknown

 

                          Ethnic Group              Unknown





Author







                          Author                    ilab

 

                          Address                   Unknown

 

                          Phone                     Unavailable







Care Team Providers







                    Care Team Member Name    Role                Phone

 

                    Estate Assist Information EXPO    Unavailable         Unavailable



                                    



Problems

                    





                    Problem                            Status                            Onset Date     

                          Classification                            Date Reported       

                          Comments                            Source                    

 

                          R94.01 - ABNORMAL ELECTROENCEPHALOGRAM                            Active          

                    2017                                                           

                                                       [facility]                

    

 

                          V76.12 - SCREEN MAMMOGRA                            Active                        

                    2012                                                                         

                                                       [facility]                    

 

                    ROUTINE                            Active                            11/10/2011     

                                                                                       

                                        [facility]                    

 

                    Vitamin D deficiency                            Active                              

                          Problem                            12/15/2018              

                                                      [facility]                    

 

                          Primary osteoarthritis involving multiple joints                            Active

                                                        Problem                      

                    12/15/2018                                                        [facility] 

                   

 

                    Pain in right knee                            Active                                

                          Problem                            12/15/2018                

                                                      Ray[facility] Wilson                    

 

                    SCREEN MAMMOGRAM NEC                            Active                              

                                                                                       

                                        [facility]                    



                                                                                
                                                                                
      



Medications

                    





                    Medication                            Details                            Route      

                          Status                            Patient Instructions         

                          Ordering Provider                            Order Date           

                                        Source                    

 

                    Centrum                            as directed                            Orally    

                          Active                            - Orally                   

                    [facility]  

                  

 

                          Osteo Bi-Flex Adv Double St                            as directed                

                    Orally                            Active                            - Orally

                            Steve Wilson                    

 

                    Losartan Potassium                            1 tablet                            Orally

                            Active                            50 MG Orally Once a day

                            [place]                                                   

                                        [facility] Wilson                    

 

                    Pantoprazole Sodium                            1 tablet                            Orally

                            Active                            40 MG Orally Once a day

                            [facility]                                                   

                                        [facility]                    

 

                          Tylenol Extra Strength                            1 tablet as needed              

                    Orally                            Active                            500 MG

 Orally every 6 hrs                            [facility]                    

 

                    Lisinopril                            1 tablet                            Orally    

                          Active                            2.5 MG Orally Once a day   

                         [facility]                                                        

[facility]                    

 

                          Metoprolol Tartrate                            1 tablet with food                 

                    Orally                            Active                            25 MG Orally

 Twice a day                            [place]                                       

                                        [facility]                    

 

                    Pravastatin Sodium                            1 tablet                            Orally

                            Active                            10 MG Orally Once a day

                            [facility]                                                   

                                        [facility]                    



                                                                                
                                                                                
                                  



Allergies, Adverse Reactions, Alerts

                    





                    Substance                            Category                            Reaction   

                          Severity                            Reaction type           

                          Status                            Date Reported                     

                          Comments                            Source                    

 

                    penicillin                            Adverse Reaction                            Info

 Not Available                                                        Adverse Reaction

                            Active                            2018             

                                                      [facility]                    



                                                        



Immunizations

        





                                        No Data Provided for This Section



                                     



Results







                                        No Data Provided for This Section



                    



Pathology Reports







                                        No Data Provided for This Section                    



                                                



Diagnostic Reports

                    





                    Report                            Value                            Date             

                                        Source                    

 

                          Chest w/wo contrast CT                            Exam: CT scan of the chest with 

and without contrast

Reason for Exam: Abnormal echocardiogram

Comparison Exam: None available

Technique: Multiple axial images were obtained of the chest. 3.75 mm slices were
acquired with and without injection of 100 cc Omnipaque IV. In addition, 
reformatted sagittal and coronal images were obtained for additional diagnostic 
information.  Total exam SHT=292 mGy-cm.

Discussion:

Cardiac pacemaker device is noted. Visualized portions of the thyroid gland are 
unremarkable. No mediastinal, hilar, or axillary lymphadenopathy. The heart size
is within normal limits. No pericardial or pleural effusions. The central 
airways are patent.  Central airways are patent. No interstitial thickening or 
bronchiectasis. No focal lung consolidations. Note is made of multiple 
subcentimeter pulmonary nodules. The largest is seen within the superior segment
of the right upper lobe measuring 9 mm (series 4, 43). Others are seen within 
the minor fissure (4, 47) and left major fissure (series 4, 44).

The visualized portions of the adrenal glands are within normal limits. Cyst 
seen within the liver measuring 6.1 cm. Partially seen cyst within the superior 
pole of the right kidney. No evidence for thoracic aortic aneurysm or 
dissection.

Impression:

                                        1.  The heart and mediastinum are unremarkable in appearance. No evidence for thoracic

 aortic aneurysm.

                                        2. Multiple subcentimeter pulmonary nodules as detailed above. Correlate with patient's

 clinical history and consider short-term follow-up exam.

                            03/15/2017                             ALYSHA Jackson   

                 

 

                          Spine lumbar minimum 4 views                            LUMBAR SPINE SERIES

 

CLINICAL HISTORY:

Low back pain.

 

COMPARISON IMAGING:

None.

 

FINDINGS:

Five views of the lumbar spine were obtained. Moderate multilevel degenerative 
changes are present, including loss of disc height, endplate sclerosis, marginal
osteophytes, and facet hypertrophy. Multiple neural foramina appear narrowed. 
There is approximately 14 mm of grade 2 anterolisthesis at L4-L5. No pars defect
is seen, suggesting facet arthrosis as the etiology of this finding. Vertebral 
body heights and alignment are otherwise maintained. No acute fracture is seen. 
Soft tissues are grossly unremarkable.

 

IMPRESSION:

Moderate multilevel degenerative changes with grade 2 anterolisthesis at L4-L5.

                            07/10/2014                             ALYSHA Sierraa   

                 

 

                          Knee AP and lateral                            RIGHT KNEE SERIES

 

CLINICAL HISTORY:  

Knee pain.

 

COMPARISON IMAGING: 

None.

 

FINDINGS: 

                                        2 views of the right knee are submitted for interpretation. Moderate to severe osteoarthritis

 is present, most prominent in the medial compartment. These changes include 
loss of joint space, subchondral sclerosis, and marginal osteophytes. There is 
no fracture. A large joint effusion is seen. Soft tissues are unremarkable.

 

IMPRESSION:

Moderate to severe osteoarthritis with a large joint effusion.

                            07/10/2014                             ALYSHA Jackson   

                 

 

                          Hip min 2 views                            LEFT HIP SERIES

 

CLINICAL HISTORY:

Hip pain.

 

COMPARISON IMAGING:

None.

 

FINDINGS:

Two views were submitted for evaluation. Moderate to severe loss of joint space,
large marginal osteophytes, and mild subchondral sclerosis at the left hip 
indicate moderate to severe osteoarthritis. No fracture, dislocation, or 
suspicious radiopaque foreign body is seen. Soft tissues are unremarkable.

 

IMPRESSION:

Moderate to severe osteoarthritis.

                            07/10/2014                             [facility]   

                 

 

                          Spine lumbar wo contrast MRI                            MRI LUMBAR SPINE WITHOUT CONTRAST

 2013

 

CLINICAL: Bilateral lumbar radiculopathy. 

 

TECHNIQUE: Sagittal T1, sagittal T2 with fat saturation, axial T1 and axial T2 
images were obtained. No intravenous gadolinium was given.

 

FINDINGS: 

 

The paravertebral soft tissues are normal. 

 

The conus medullaris terminates at the L1-L2 level. Multilevel lower thoracic 
spine degenerative changes are seen, with T10-T11 and T11-T12 disc bulges 
resulting in mild central canal stenosis.

 

No mass effect on the conus medullaris is present.

 

L1-L2: Mild disc bulge is present with minimal central canal stenosis. No 
foraminal stenosis is seen.

 

L2-L3: Mild to moderate disc bulge is present, measuring 3.9 mm in the AP 
dimension. Moderate facet osteoarthritis and ligamenta flava redundancy are seen
with mild to moderate central canal stenosis. No significant foraminal stenosis 
identified.

 

L3-L4: Mild disc bulge is present with mild central canal stenosis. No 
significant foraminal stenosis is seen. L4 superior endplate Schmorl's node is 
present.

 

L4-L5: Grade 1 anterolisthesis is present with disc bulge. Severe bilateral 
facet osteoarthritis is present. Significant ligamenta flava redundancy is seen.
Severe central canal stenosis is present. The lateral recesses are severely 
stenotic with impingement of the bilateral L5 descending nerve roots. Severe 
right foraminal stenosis and moderate to severe left foraminal stenosis are 
present with corresponding mass effect on the bilateral L4 exiting nerve root 
sleeves.

 

L5-S1: Disc bulge is present with mild thecal sac stenosis. Bilateral foraminal 
and extraforaminal disc osteophyte complexes are present, with moderate left 
foraminal stenosis and mild right foraminal stenosis. Mild mass effect on the 
bilateral L5 exiting nerve root sleeves is seen.

 

 

 

IMPRESSION:

                                        1. Multilevel disc degenerative disease and spondylosis.

                                        2. L2-L3 mild to moderate central canal stenosis.

                                        3. L4-L5 severe central canal stenosis an lateral recess stenosis with impingement

 of the bilateral L5 descending nerve roots. Grade 1 anterolisthesis. Moderate 
to severe bilateral foraminal stenosis as above.

                                        4. L5-S1 moderate left foraminal stenosis and mild right foraminal stenosis as above.



                                        5. Please see additional comments above.

 

                            2013                             [facility]   

                 



                                                                                
                                                   



Consultation Notes

                    





                                        No Data Provided for This Section                    



                                                            



Discharge Summaries

                    





                                        No Data Provided for This Section                    



                                                            



History and Physicals

                    





                                        No Data Provided for This Section                    



                                                                



Vital Signs

                     





                    Vital Sign                            Value                            Date         

                          Comments                            Source                    

 

                    Weight                            151.6                             2018      

                                                      [facility]                    

 

                    Height                            65                             2018         

                                                      [facility]                    

 

                    Heart Rate                            74                             2018     

                                                      [facility]                    



 

                    Diastolic (mm Hg)                            80                             2018

                                                        [facility]               

     

 

                    Systolic (mm Hg)                            130                             2018

                                                        [facility]               

     



                                                                                
                                                                       



Encounters

                    





                    Location                            Location Details                            Encounter

 Type                            Encounter Number                            Reason For

 Visit                            Attending Provider                            ADM Date

                            DC Date                            Status                

                                        Source                    

 

                    [facility]                                                        Outpatient      

                          [id number]                            ROUTINE                

                    N Florissant                             2011                              

                          Active                            [facility]             

       

 

                                                                        OD                            

[id number]                            V76.12 - SCREEN MAMMOGRA                   

                    NICKOLAS Florissant                             2013

                            Active                             [facility]       

             

 

                          [facility]                                                

                          Outpt Diag Services                            [id number]                  

                                                Juan Hernandez                             07/10/2014

                            2014                                               

                                         [facility]                    

 

                          [facility]                                                

                          Outpt Diag Services                            [id number]                  

                                                Coy Perez                             03/15/2017

                            2017                                               

                                         [facility]                    



                                                                                
                           



Procedures

        





                                        No Data Provided for This Section



                                                    



Assessment and Plan

                    





                                        No Data Provided for This Section                    



                                     



Plan of Care







                                        No Data Provided for This Section                    



                                                                



Social History

                    





                    Social History                            Date                            Source    

                

 

                          No data available for this section                            2017          

                                         [facility]                    



                                                                    



Family History

                    





                                        No Data Provided for This Section                    



                                                            



Advance Directives

                    





                                        No Data Provided for This Section                    



                                                            



Functional Status

                    





                                        No Data Provided for This Section

## 2019-06-30 NOTE — DIAGNOSTIC IMAGING REPORT
EXAMINATION:  CHEST SINGLE (PORTABLE)    



INDICATION: Chest pain. 



COMPARISON:  Chest radiograph 9/29/2014.

     

FINDINGS:

TUBES and LINES:  Left-sided AICD with leads in unchanged position. 



LUNGS:  Lungs are well inflated.  Lungs are clear.   There is no evidence of

pneumonia or pulmonary edema.



PLEURA:  No pleural effusion or pneumothorax. 



HEART AND MEDIASTINUM:  The cardiomediastinal silhouette is unremarkable.    



BONES AND SOFT TISSUES:  No acute osseous abnormality.



UPPER ABDOMEN: No free air under the diaphragm.    



IMPRESSION: 

No acute radiographic abnormality.



Signed by: Dr. Maria Luisa Chacon MD on 6/30/2019 3:38 PM

## 2019-07-01 VITALS — SYSTOLIC BLOOD PRESSURE: 125 MMHG | DIASTOLIC BLOOD PRESSURE: 59 MMHG

## 2019-07-01 VITALS — SYSTOLIC BLOOD PRESSURE: 152 MMHG | DIASTOLIC BLOOD PRESSURE: 61 MMHG

## 2019-07-01 VITALS — DIASTOLIC BLOOD PRESSURE: 70 MMHG | SYSTOLIC BLOOD PRESSURE: 133 MMHG

## 2019-07-01 VITALS — DIASTOLIC BLOOD PRESSURE: 55 MMHG | SYSTOLIC BLOOD PRESSURE: 113 MMHG

## 2019-07-01 VITALS — SYSTOLIC BLOOD PRESSURE: 148 MMHG | DIASTOLIC BLOOD PRESSURE: 69 MMHG

## 2019-07-01 VITALS — DIASTOLIC BLOOD PRESSURE: 73 MMHG | SYSTOLIC BLOOD PRESSURE: 159 MMHG

## 2019-07-01 VITALS — DIASTOLIC BLOOD PRESSURE: 63 MMHG | SYSTOLIC BLOOD PRESSURE: 145 MMHG

## 2019-07-01 VITALS — SYSTOLIC BLOOD PRESSURE: 135 MMHG | DIASTOLIC BLOOD PRESSURE: 61 MMHG

## 2019-07-01 VITALS — DIASTOLIC BLOOD PRESSURE: 85 MMHG | SYSTOLIC BLOOD PRESSURE: 152 MMHG

## 2019-07-01 VITALS — SYSTOLIC BLOOD PRESSURE: 144 MMHG | DIASTOLIC BLOOD PRESSURE: 67 MMHG

## 2019-07-01 VITALS — SYSTOLIC BLOOD PRESSURE: 159 MMHG | DIASTOLIC BLOOD PRESSURE: 73 MMHG

## 2019-07-01 VITALS — DIASTOLIC BLOOD PRESSURE: 58 MMHG | SYSTOLIC BLOOD PRESSURE: 136 MMHG

## 2019-07-01 VITALS — SYSTOLIC BLOOD PRESSURE: 138 MMHG | DIASTOLIC BLOOD PRESSURE: 61 MMHG

## 2019-07-01 VITALS — SYSTOLIC BLOOD PRESSURE: 151 MMHG | DIASTOLIC BLOOD PRESSURE: 63 MMHG

## 2019-07-01 VITALS — SYSTOLIC BLOOD PRESSURE: 147 MMHG | DIASTOLIC BLOOD PRESSURE: 83 MMHG

## 2019-07-01 VITALS — SYSTOLIC BLOOD PRESSURE: 133 MMHG | DIASTOLIC BLOOD PRESSURE: 68 MMHG

## 2019-07-01 VITALS — SYSTOLIC BLOOD PRESSURE: 143 MMHG | DIASTOLIC BLOOD PRESSURE: 66 MMHG

## 2019-07-01 VITALS — DIASTOLIC BLOOD PRESSURE: 55 MMHG | SYSTOLIC BLOOD PRESSURE: 128 MMHG

## 2019-07-01 VITALS — SYSTOLIC BLOOD PRESSURE: 174 MMHG | DIASTOLIC BLOOD PRESSURE: 78 MMHG

## 2019-07-01 VITALS — DIASTOLIC BLOOD PRESSURE: 61 MMHG | SYSTOLIC BLOOD PRESSURE: 142 MMHG

## 2019-07-01 LAB
ANION GAP SERPL CALC-SCNC: 16.1 MMOL/L (ref 8–16)
BASOPHILS # BLD AUTO: 0 10*3/UL (ref 0–0.1)
BASOPHILS NFR BLD AUTO: 0.5 % (ref 0–1)
BUN SERPL-MCNC: 13 MG/DL (ref 7–26)
BUN/CREAT SERPL: 12 (ref 6–25)
CALCIUM SERPL-MCNC: 9.4 MG/DL (ref 8.4–10.2)
CHLORIDE SERPL-SCNC: 106 MMOL/L (ref 98–107)
CK MB SERPL-MCNC: 2.8 NG/ML (ref 0–5)
CK MB SERPL-MCNC: 3.5 NG/ML (ref 0–5)
CK SERPL-CCNC: 189 IU/L (ref 29–168)
CK SERPL-CCNC: 206 IU/L (ref 29–168)
CO2 SERPL-SCNC: 23 MMOL/L (ref 22–29)
DEPRECATED NEUTROPHILS # BLD AUTO: 4.7 10*3/UL (ref 2.1–6.9)
EGFRCR SERPLBLD CKD-EPI 2021: 49 ML/MIN (ref 60–?)
EOSINOPHIL # BLD AUTO: 0 10*3/UL (ref 0–0.4)
EOSINOPHIL NFR BLD AUTO: 0.2 % (ref 0–6)
ERYTHROCYTE [DISTWIDTH] IN CORD BLOOD: 13.2 % (ref 11.7–14.4)
GLUCOSE SERPLBLD-MCNC: 108 MG/DL (ref 74–118)
HCT VFR BLD AUTO: 27.7 % (ref 34.2–44.1)
HGB BLD-MCNC: 9.1 G/DL (ref 12–16)
LYMPHOCYTES # BLD: 1.1 10*3/UL (ref 1–3.2)
LYMPHOCYTES NFR BLD AUTO: 17.4 % (ref 18–39.1)
MCH RBC QN AUTO: 29.5 PG (ref 28–32)
MCHC RBC AUTO-ENTMCNC: 32.9 G/DL (ref 31–35)
MCV RBC AUTO: 89.9 FL (ref 81–99)
MONOCYTES # BLD AUTO: 0.5 10*3/UL (ref 0.2–0.8)
MONOCYTES NFR BLD AUTO: 8.4 % (ref 4.4–11.3)
NEUTS SEG NFR BLD AUTO: 72.7 % (ref 38.7–80)
PLATELET # BLD AUTO: 153 X10E3/UL (ref 140–360)
POTASSIUM SERPL-SCNC: 4.1 MMOL/L (ref 3.5–5.1)
RBC # BLD AUTO: 3.08 X10E6/UL (ref 3.6–5.1)
SODIUM SERPL-SCNC: 141 MMOL/L (ref 136–145)

## 2019-07-01 RX ADMIN — Medication SCH ML: at 01:00

## 2019-07-01 RX ADMIN — ASPIRIN 81 MG CHEWABLE TABLET SCH MG: 81 TABLET CHEWABLE at 09:00

## 2019-07-01 RX ADMIN — Medication SCH ML: at 07:30

## 2019-07-01 RX ADMIN — LOSARTAN POTASSIUM SCH MG: 25 TABLET, FILM COATED ORAL at 09:00

## 2019-07-01 RX ADMIN — SODIUM CHLORIDE SCH MG: 900 INJECTION INTRAVENOUS at 18:06

## 2019-07-01 RX ADMIN — Medication SCH ML: at 13:25

## 2019-07-01 RX ADMIN — PRAVASTATIN SODIUM SCH MG: 20 TABLET ORAL at 20:46

## 2019-07-01 RX ADMIN — LISINOPRIL SCH MG: 2.5 TABLET ORAL at 09:00

## 2019-07-01 RX ADMIN — LORAZEPAM PRN MG: 2 INJECTION INTRAMUSCULAR; INTRAVENOUS at 03:39

## 2019-07-01 RX ADMIN — CEFEPIME HYDROCHLORIDE SCH MLS/HR: 1 INJECTION, POWDER, FOR SOLUTION INTRAMUSCULAR; INTRAVENOUS at 18:30

## 2019-07-01 RX ADMIN — METOPROLOL SUCCINATE SCH MG: 25 TABLET, EXTENDED RELEASE ORAL at 09:00

## 2019-07-01 RX ADMIN — Medication SCH ML: at 19:40

## 2019-07-01 RX ADMIN — CEFEPIME HYDROCHLORIDE SCH MLS/HR: 1 INJECTION, POWDER, FOR SOLUTION INTRAMUSCULAR; INTRAVENOUS at 06:02

## 2019-07-01 RX ADMIN — SODIUM CHLORIDE SCH MG: 900 INJECTION INTRAVENOUS at 09:58

## 2019-07-01 NOTE — HISTORY AND PHYSICAL
CHIEF COMPLAINT:  An 79-year-old female comes in with acute mental status changes.

 

HISTORY OF PRESENT ILLNESS:  Ms. Lona Painting 79-year-old female with a history of

pacemaker placement, history of recent knee replacement by Dr. Mejía, was in usual

state of health until the patient was talking to her daughter when her sister suddenly

felt that she became aphasic, word salads were noticed and the patient started to have

anxiety attacks and acute shortness of breath and also impaired speech.  The patient

came into the emergency room, was admitted for possible TIA/stroke. 

 

PAST MEDICAL HISTORY:  History of congestive heart failure, history of pacemaker

placement, history of hypertension, history of hyperlipidemia, history of anxiety and

history of insomnia. 

 

PAST SURGICAL HISTORY:  History of bladder suspension, hysterectomy, history of right

knee surgery.  The patient also has been complaining of the right knee pain with some

ecchymosis in the lower extremities. 

 

MEDICATIONS:  The patient takes her aspirin 81 mg, ibuprofen 400 mg as needed,

lisinopril 2.5 mg daily, losartan 100 mg daily, metoprolol 25 mg daily, pravastatin 10

mg daily, tramadol 50 mg for pain and vitamin D. 

 

ALLERGIES:  ALLERGIC TO PENICILLIN.

 

SOCIAL HISTORY:  No EtOH, no IV drug abuse.  The patient is a  and no history of

smoking either. 

 

FAMILY HISTORY:  Positive for hypertension, heart disease, and also CVA.

 

REVIEW OF SYSTEMS:

Negative for chest pain.  No shortness of breath.  No nausea, vomiting, or diarrhea.  No

constipation.  No rectal bleeding.  No hematochezia.  No hematemesis.  Positive for

sudden change in mental status and also the lack of eating.  The patient has not been

eating for the last three days, did not want food and has been eating a piece of toast

every single day. 

 

PHYSICAL EXAMINATION:

VITAL SIGNS:  Temperature is 98.2, pulse of 99, respirations of 21, blood pressure is

152/85, pulse oximetry of 100% on 2 L of oxygen. 

HEENT:  Normocephalic, atraumatic.  No facial grimacing.  Facial droop noted. 

CVS:  S1, S2 normal.  Regular rate and rhythm. 

LUNGS:  Clear to auscultation bilaterally. 

ABDOMEN:  Nontender, nondistended. 

EXTREMITIES:  No clubbing, no cyanosis, no edema. 

NEUROLOGIC:  Cranial nerves normal.  The patient's motor strength very good in all

extremities, in all muscle groups.  Reflexes 2+ in all extremities.  The patient's

speech is normal at this time, but apparently the whole night the patient had word salad

and was not making any sense. 

LABORATORY VALUES:  The patient's white count yesterday was 5.6, hemoglobin 11.9,

hematocrit 35.8.  Chemistries, sodium 141, potassium 4.1, BUN 13, creatinine of 1.01.

Her lactic acid was 21.5.  Creatine kinase was 189.  The second set troponins negative

x2. 

 

MICROBIOLOGY:  Urine culture preliminary shows no growth and holding.  Blood cultures

are pending. 

 

ASSESSMENT:  

1. Acute mental status changes, questionable transient ischemic attack.

2. Elevated lactic acid.

3. History of recent right knee surgery.

 

PLAN:  Plan is to do a CT angio of the brain and neck.  The patient's brain CT, chest

CT, and chest x-rays are essentially normal.  Chest CT did show bilateral pulmonary

nodules, which has to be followed up in 36 months and also diffuse esophageal thickening

of the esophagus, which could attribute to her not vomiting.  We will start the patient

on some Protonix too.  Consult for Dr. Hahn has been done.  We will continue

monitoring her vitals and also Geodon has been given one dose 5 mg IM and also Ativan

0.25.  We will try to not over sedate the patient.  Further recommendation per clinical

course and also depending on the CTA brain and CTA of neck, had also done a venous

Doppler of the right lower extremity and also we will do an echocardiogram.  Further

recommendation per clinical course.  We will continue to monitor the patient along with

consultants. 

 

 

 

 

______________________________

MD GALEN Dennis/EMILIAL

D:  07/01/2019 07:03:33

T:  07/01/2019 12:11:16

Job #:  [id number]

## 2019-07-01 NOTE — NUR
ST note:



Order for bedside swallow eval noted.  Attempted to complete eval but pt off floor for CT.  
Will return later today.

## 2019-07-01 NOTE — DIAGNOSTIC IMAGING REPORT
Examination: Single AP view of the chest.



COMPARISON: June 30, 2019



INDICATION: Shortness of breath

     

DISCUSSION:



Lines/tubes:  Multi lead cardiac device.



Lungs:  The lungs are well inflated and clear. No pneumonia or pulmonary edema.



Pleura:  No pleural effusion or pneumothorax.



Heart and mediastinum:  The heart is mildly enlarged.



Bones and soft tissues:  No acute bony abnormalities.     



IMPRESSION:

 

1.   No acute cardiopulmonary abnormalities.



Signed by: Dr. Andrew Palisch, M.D. on 7/1/2019 5:33 AM

## 2019-07-01 NOTE — CONSULTATION
DATE OF CONSULTATION:  07/01/2019  

 

HISTORY OF PRESENT ILLNESS:  Ms. Painting is an 79-year-old right-hand dominant 
woman with

past medical history significant for hypertension, hyperlipidemia, prediabetes, 
and a

prior transient ischemic attack, admitted to [facility] on June 30,
2019

with symptoms concerning for a transient ischemic attack or stroke. 

 

Ms. Painting was in her usual state of health until approximately 1100 on the day 
of

admission.  At that time, the patient experienced the sudden onset of expressive

aphasia.  Her sister, who is at the bedside and witnessed the onset of symptoms,
reports

the patient was speaking "gibberish."  There were no other symptoms associated 
with the

abrupt onset of expressive aphasia.  Ms. Painting does not report a visual field 
cut or

other disturbance, dysarthria, receptive aphasia, weakness, numbness, dizziness,
or

confusion associated with the expressive aphasia.  The patient did not attempt 
to walk

while she experienced the expressive aphasia.  Therefore, the patient cannot say
whether

or not her balance or gait was affected. 

 

Following the onset of the above described symptom, Ms. Painting was brought to 
the

emergency center for further evaluation of her symptoms.  Upon arrival in the 
emergency

center, the patient was afebrile with a blood pressure of 127/85 mmHg and a 
pulse of 92

beats per minute.  Upon admission to the emergency center, Ms. Painting was 
tachypneic

with a respiratory rate of 38 breaths per minute.  Her oxygen saturation was 
100% on

room air. 

Documentation of the patient's neurological examination is not available for 
review.  A

CT of the brain without contrast was performed while the patient was in the 
emergency

center.  This study did not reveal evidence of recent large territorial ischemia
or

hemorrhage.  Due to the patient's tachypnea, there was concern for possible 
pulmonary

embolus. Therefore, 

Ms. Painting was admitted to the intensive care unit at [facility] 
for

further evaluation and treatment of her symptoms. 

 

I was contacted by the night shift nurse caring for Ms. Painting on the evening of
June 30, 2019.  According to the nurse, the patient was "talking out of her head" and

appeared anxious/agitated. 

 

According to the dayshift nurse, who has cared for Ms. Painting on the day of 
evaluation,

her speech has been "perfectly clear" without dysarthria, expressive aphasia, or

receptive aphasia.  The nurse does report the patient is only oriented to person
and

place.  At times, the patient has only been oriented to person.  Of note, at

approximately 1530 on the day of evaluation, Ms. Painting once again exhibits 
expressive

aphasia with possible mild dysarthria.  The symptoms have resolved at the time 
of her

neurological evaluation approximately 30-45 minutes later. 

 

When the patient's family members are asked about the duration and severity of 
the

patient's symptoms, Ms. Painting's sister reports her symptoms have significantly 
improved

over the past 24 hours, but have never resolved. 

 

Ms. Painting does report "some anxiety" related to her hospitalization.  
Furthermore, her

sister reports answering multiple questions does make the patient anxious.  When
asked

if she was upset or anxious at the time of symptom onset, the patient replies "I
guess

so." 

 

Ms. Painting has not experienced similar symptoms previously.  She does take 
aspirin 81 mg

by mouth twice daily at the instruction of her physicians.  Ms. Painting reports

compliance with this recommendation.  However, the patient's sister reports Ms. Painting

does forget to take her medications, especially if she has to take a medicine 
more than

once a day.  When asked if there are any concerns regarding memory loss or other

cognitive impairment, the patient's sister responds as follows; "no more so than
any

other old person." 

 

REVIEW OF SYSTEMS:

Shortness of breath, confusion, expressive aphasia, anxiety.  Otherwise, a 12-
point

review of systems is negative. 

 

PAST MEDICAL HISTORY:  Hypertension, hyperlipidemia, prediabetes, congestive 
heart

failure, chronic kidney disease stage IIIA, anxiety disorder, prior transient 
ischemic

attack. 

 

PAST SURGICAL HISTORY:  Right knee replacement, hysterectomy, hemorrhoidectomy,

appendectomy, pacemaker implantation. 

 

PAST HOSPITALIZATIONS:  Surgeries/procedures as listed, congestive heart failure

exacerbation, transient ischemic attack. 

 

FAMILY MEDICAL HISTORY:  Diabetes mellitus, coronary artery disease/heart 
disease,

strokes. 

 

SOCIAL HISTORY:  Ms. Painting is .  She is retired.  The patient does not 
report

current or prior tobacco, alcohol, or recreational drug use. 

 

HOME MEDICATIONS:  Aspirin 81 mg by mouth twice daily, lisinopril 2.5 mg by 
mouth daily,

losartan 50 mg by mouth daily, metoprolol 25 mg by mouth daily, pravastatin 10 
mg by

mouth daily, vitamin D 1 tablet by mouth daily, ibuprofen 800 mg by mouth every 
8 hours

as needed for pain, tramadol 50 mg by mouth every 6 hours as needed for pain. 

 

HOSPITAL MEDICATIONS:  Albuterol, aspirin, cefepime, hydralazine, lisinopril, 
lorazepam,

losartan, metoprolol, pravastatin, Zofran, Protonix. 

 

ALLERGIES:  PENICILLIN.  NO KNOWN FOOD ALLERGIES.  NO KNOWN ALLERGIES TO LATEX. 
NO

KNOWN ALLERGIES TO IODINE OR OTHER CONTRAST MATERIALS. 

 

PHYSICAL EXAMINATION:

VITAL SIGNS:  Height 67 inches, weight 151 pounds, BMI 23.7 kg/m2, blood 
pressure 136/58

mmHg, pulse 86 beats per minute, respiratory rate 20 breaths per minute, and 
oxygen

saturation 100% on 2 L by nasal cannula. 

GENERAL:  The patient is awake and alert, does not appear distressed. 

HEENT:  Normocephalic, atraumatic.  Pupils are equal, round, and reactive to 
light.

Moist mucous membranes. 

NECK:  Supple.  No appreciable thyromegaly.  No appreciable carotid bruits. 

CARDIOVASCULAR:  S1, S2, regular rate and rhythm.  No murmurs, rubs, or gallops.


RESPIRATORY:  Clear to auscultation bilaterally.  No wheezes, rhonchi, or rales.


EXTREMITIES:  The skin is warm and dry.  No clubbing, cyanosis, or edema.  The 
posterior

tibial and dorsalis pedis pulses are 2+ and symmetric. 

SKIN:  No rashes or lesions. 

NEUROLOGIC: 

Memory/Attention:  The patient is awake and alert, oriented to person, place 
(hospital,

city, state), time (month only), but not to situation. 

Cranial Nerves:  Cranial nerve I - not tested.  Cranial nerve II, III, IV, and 
VI -

pupils are equal and round, react briskly to light (from 4 mm to 2 mm).  
Extraocular

movements intact.  No nystagmus.  Cranial nerve V - sensation to light touch and

pinprick is intact in the bilateral V1 through V3 distributions.  Strength in 
the

temporalis and masseter muscles are within normal limits.  Cranial nerve VII - 
the face

is symmetric as are all facial movements.  Strength is within normal limits.  
Cranial

nerve VIII - hearing is intact to finger rub bilaterally.  Cranial nerve 9, 10-
the soft

palate elevates equally and symmetrically.  Cranial nerve XI - normal strength 
of the

bilateral sternocleidomastoid and trapezius muscles.  Cranial nerve XII - the 
tongue

protrudes midline and moves symmetrically from side-to-side. 

Strength:  Bulk is normal.  Strength is 5/5 in the bilateral deltoids, biceps, 
triceps,

wrist flexors and extensors, finger flexors and extensors, intrinsic hand 
muscles, hip

flexors, knee flexors and extensors, ankle dorsiflexion and plantar flexion, and

intrinsic foot muscles.  Tone is normal. 

DTRs:  Deep tendon reflexes are 2+ and symmetric at the triceps, biceps,

brachioradialis, and patellas.  Deep tendon reflexes are absent and symmetric at
the

Achilles.  Plantar responses are flexor bilaterally.  The frontal release signs 
are

noted. 

Sensation:  Sensation is intact to light touch and pinprick in both arms and 
both legs. 

Cerebellar:  Finger-nose-finger and heel-shin movements are intact without 
dysmetria or

other impairment. 

Gait:  Deferred. 

Speech:  Spontaneous speech is normal without appreciable dysarthria.  The 
patient does

appear to have word-finding difficulties.  Repetition is intact. 

Involuntary movements:  None. 

Pronator Drift:  None.



LABORATORY DATA:  The patient's most recent basic metabolic panel is significant
for an

anion gap of 16.1 with an estimated GFR of 49.  Liver function panel collected 
on June 30, 2019 is unremarkable.  B-natriuretic peptide 18.2.  Lactic acid 21.5.  
Cardiac

enzymes are negative x4.  CBC with differential and platelets reveals a white 
blood cell

count of 6.45 with a normal differential.  The hemoglobin and hematocrit are 9.1
and

27.7, respectively.  The platelet count is 153.  The coagulation profile is 
within

normal limits.  A quantitative D-dimer is elevated at 4.40.  Urinalysis is 
significant

for 6-10 white blood cells with 11-15 hyaline casts.  A urine culture collected 
on June 30, 2019 is pending.  Blood cultures collected on June 30, 2019 revealed no 
growth after

24 hours. 

 

DIAGNOSTIC STUDIES:  

1. Chest x-ray on 06/30/2019:  No acute radiographic abnormality.

2. Chest CT 06/30/2019:  No evidence of pulmonary embolism to the level of the 
segmental

pulmonary arteries.  Bilateral pulmonary nodules, measuring up to 7 mm in the 
right

lower lobe.  Follow up CT is recommended in 3-6 months.  Diffuse mild esophageal
wall

thickening which may represent esophagitis.  If clinically indicated, followup 
EGD may

be considered. 

3. CT of the brain without contrast of 06/30/2019:  On my review, there is no 
evidence

of recent or remote large territorial ischemia, hemorrhage, mass, or mass 
effect.  There

is diffuse cerebral atrophy with compensatory dilatation of the ventricles, 
appropriate

for the patient's age.  There are findings compatible with mild chronic small-
vessel

ischemic disease. 

4. CTA of the head and neck 07/01/2019:  Unremarkable CT angiogram of the head 
and neck.

5. Electrocardiogram of 07/01/2019:  Electronic ventricular pacemaker at 77 
beats per

minute. 

6. Echocardiogram 07/01/2019:  Ejection fraction 50% to 55%.  Left ventricular

hypertrophy.  Trace mitral regurgitation.  Mild tricuspid regurgitation and 
pulmonic

insufficiency. 

7. Bilateral lower extremity ultrasound with Doppler 07/01/2019:  No evidence of
deep

venous thrombosis. 

8. Chest x-ray 07/01/2019:  No acute cardiopulmonary abnormalities.

 

ASSESSMENT AND PLAN:  Ms. Painting is an 79-year-old right-hand dominant woman 
with past

medical history as detailed, admitted to [facility] on June 30, 2019 with

altered mental status and acute kidney injury with suspicion for a transient 
ischemic

attack or stroke.  The patient's neurological examination is significant for

disorientation to time and situation, positive frontal release signs, and word-
finding

difficulties.  The patient's laboratory data and other diagnostic studies have 
been

reviewed and are documented above. 

 

Given the persistent, yet fluctuating nature, of the patient's speech 
disturbance, there

is low suspicion for transient ischemic attack.  Furthermore, at present, the 
patient's

speech disturbance has been present for approximately 32 hours.  A transient 
ischemic

attack by definition persists for less than 24 hours.  In reality, the majority 
of

transient ischemic attacks persist for 10 minutes or less.  There is no evidence
of a

stroke on the patient's neuroimaging studies.  Therefore, this is not the cause 
of the

patient's symptoms.  



Based on the description of the patient's symptoms as well as

findings on the neurological examination, I strongly suspect Ms. Painting has an

underlying dementia, probably of the Alzheimer's type.  This would account for 
her

word-finding difficulties as well as the symptoms described by the night shift 
nurse

(sundowning).  The acute exacerbation of the patient's underlying symptoms may 
be due to

dehydration, an underlying infection, anxiety attack, etc. 

 

RECOMMENDATIONS:  Are as follows:

1. Additional blood work will be ordered as part of an evaluation for dementia. 
That

blood work will include:  TSH, vitamin B12 level, ammonia, and rapid plasma 
reagin. 

2. Further evaluation for an underlying memory disorder is recommended as an 
outpatient.

 Evaluation and treatment of a patient for an underlying memory disorder is 
suboptimal

during an acute hospitalization. 

3. Defer treatment of the remaining medical comorbidities to the primary and 
other

services following the patient. 



Thank you for this consultation. 

 

Other than the few additional laboratory studies ordered, there are no other

recommendations from the Neurology Service at this time.  As stated above, Ms. Painting is

encouraged to follow up as an outpatient for further evaluation and treatment of

possible dementia. 

 

TIME SPENT:  70 minutes.

 

 

 

 

______________________________

Pooja Hahn MD CP/QUINTIN

D:  07/01/2019 18:12:58

T:  07/01/2019 21:31:33

Job #:  [id number]

 

MTDD

## 2019-07-01 NOTE — DIAGNOSTIC IMAGING REPORT
EXAMINATION: CT angiogram of the [place] and neck with contrast 



CLINICAL HISTORY: Cerebrovascular accident, alteration of consciousness, kidney

disease.

COMPARISON: None available

TECHNIQUE: The head  and neck was scanned utilizing a multidetector helical

scanner from the thoracic inlet to  the vertex after the I.V contrast

administration of 100 mL of Isovue-370. Multiplanar sagittal and coronal

reconstructions were performed as well.

For optimization of of anatomic evaluation, multi-planar reconstructions,

maximum intensity projections, and advanced 3D off-line post-processing was

obtained and performed on a dedicated stand-alone workstation under the direct

supervision of the interpreting physician.



Dose modulation, iterative reconstruction, and/or weight based adjustment of

the mA/kV was utilized to reduce the radiation dose to as low as reasonably

achievable.



FINDINGS:

The are no areas of abnormal density or enhancement  in the brain. There is no

mass, hemorrhage or extra-axial fluid collection. The CSF containing spaces are

normal in size, position and configuration. 



CT ANGIOGRAM OF THE [place]:

The internal carotid artery segments as well as the middle cerebral and

anterior cerebral arteries are normal in caliber. The vertebro-basilar

circulation is normal. No vascular malformation or aneurysmal dilatation is

seen.

The draining venous structures are normal and patent.



Anatomic variation:

Anterior Communicating Artery: Patent

Posterior Communicating Arteries: Patent bilaterally with small infundibulum on

the right side.

Vertebral arteries: Codominant.

The left vertebral artery is taking off directly from the aortic arch.





CT ANGIOGRAM OF THE NECK:

If present, stenosis of the carotid bulbs is measured based on NASCET criteria

i.e area of maximum stenosis compared to the cervical ICA distal to the bulb.



Minimal calcified atherosclerotic change in the bilateral carotid bifurcations

and carotid bulbs without associated stenosis (0%).



The origin of the vessels is  patent bilaterally.



   Right Carotid Artery:

The common carotid, internal and external carotid arteries at the level of the

neck are normal in caliber, and patent, no evidence of stenoses. 

   Left carotid artery:

The common carotid, internal and external carotid arteries at the level of the

neck are normal in caliber, and patent, no evidence of stenoses.

   Vertebral Arteries:

Both are normal in morphology and caliber. Both are codominant. No significant

stenosis is seen.





IMPRESSION:



Within normal limits CT angiogram of the head and neck, particularly no large

vessel occlusion or hemodynamically significant stenosis of the carotid or

vertebral arteries in the head or neck.



Signed by: Dr. Jade Welch M.D. on 7/1/2019 1:35 PM

## 2019-07-01 NOTE — DIAGNOSTIC IMAGING REPORT
EXAMINATION: CT angiogram of the [place] and neck with contrast 



CLINICAL HISTORY: Cerebrovascular accident, alteration of consciousness, kidney

disease.

COMPARISON: None available

TECHNIQUE: The head  and neck was scanned utilizing a multidetector helical

scanner from the thoracic inlet to  the vertex after the I.V contrast

administration of 100 mL of Isovue-370. Multiplanar sagittal and coronal

reconstructions were performed as well.

For optimization of of anatomic evaluation, multi-planar reconstructions,

maximum intensity projections, and advanced 3D off-line post-processing was

obtained and performed on a dedicated stand-alone workstation under the direct

supervision of the interpreting physician.



Dose modulation, iterative reconstruction, and/or weight based adjustment of

the mA/kV was utilized to reduce the radiation dose to as low as reasonably

achievable.



FINDINGS:

The are no areas of abnormal density or enhancement  in the brain. There is no

mass, hemorrhage or extra-axial fluid collection. The CSF containing spaces are

normal in size, position and configuration. 



CT ANGIOGRAM OF THE [place]:

The internal carotid artery segments as well as the middle cerebral and

anterior cerebral arteries are normal in caliber. The vertebro-basilar

circulation is normal. No vascular malformation or aneurysmal dilatation is

seen.

The draining venous structures are normal and patent.



Anatomic variation:

Anterior Communicating Artery: Patent

Posterior Communicating Arteries: Patent bilaterally with small infundibulum on

the right side.

Vertebral arteries: Codominant.

The left vertebral artery is taking off directly from the aortic arch.





CT ANGIOGRAM OF THE NECK:

If present, stenosis of the carotid bulbs is measured based on NASCET criteria

i.e area of maximum stenosis compared to the cervical ICA distal to the bulb.



Minimal calcified atherosclerotic change in the bilateral carotid bifurcations

and carotid bulbs without associated stenosis (0%).



The origin of the vessels is  patent bilaterally.



   Right Carotid Artery:

The common carotid, internal and external carotid arteries at the level of the

neck are normal in caliber, and patent, no evidence of stenoses. 

   Left carotid artery:

The common carotid, internal and external carotid arteries at the level of the

neck are normal in caliber, and patent, no evidence of stenoses.

   Vertebral Arteries:

Both are normal in morphology and caliber. Both are codominant. No significant

stenosis is seen.





IMPRESSION:



Within normal limits CT angiogram of the head and neck, particularly no large

vessel occlusion or hemodynamically significant stenosis of the carotid or

vertebral arteries in the head or neck.



Signed by: Dr. Jade Welch M.D. on 7/1/2019 1:35 PM

## 2019-07-02 VITALS — SYSTOLIC BLOOD PRESSURE: 154 MMHG | DIASTOLIC BLOOD PRESSURE: 63 MMHG

## 2019-07-02 VITALS — DIASTOLIC BLOOD PRESSURE: 59 MMHG | SYSTOLIC BLOOD PRESSURE: 132 MMHG

## 2019-07-02 VITALS — SYSTOLIC BLOOD PRESSURE: 138 MMHG | DIASTOLIC BLOOD PRESSURE: 70 MMHG

## 2019-07-02 VITALS — DIASTOLIC BLOOD PRESSURE: 68 MMHG | SYSTOLIC BLOOD PRESSURE: 149 MMHG

## 2019-07-02 VITALS — DIASTOLIC BLOOD PRESSURE: 61 MMHG | SYSTOLIC BLOOD PRESSURE: 145 MMHG

## 2019-07-02 VITALS — DIASTOLIC BLOOD PRESSURE: 72 MMHG | SYSTOLIC BLOOD PRESSURE: 136 MMHG

## 2019-07-02 VITALS — SYSTOLIC BLOOD PRESSURE: 149 MMHG | DIASTOLIC BLOOD PRESSURE: 68 MMHG

## 2019-07-02 VITALS — DIASTOLIC BLOOD PRESSURE: 54 MMHG | SYSTOLIC BLOOD PRESSURE: 145 MMHG

## 2019-07-02 VITALS — DIASTOLIC BLOOD PRESSURE: 61 MMHG | SYSTOLIC BLOOD PRESSURE: 143 MMHG

## 2019-07-02 RX ADMIN — LISINOPRIL SCH MG: 2.5 TABLET ORAL at 07:55

## 2019-07-02 RX ADMIN — ASPIRIN 81 MG CHEWABLE TABLET SCH MG: 81 TABLET CHEWABLE at 07:54

## 2019-07-02 RX ADMIN — CEFEPIME HYDROCHLORIDE SCH MLS/HR: 1 INJECTION, POWDER, FOR SOLUTION INTRAMUSCULAR; INTRAVENOUS at 18:02

## 2019-07-02 RX ADMIN — Medication SCH ML: at 07:00

## 2019-07-02 RX ADMIN — SODIUM CHLORIDE SCH MG: 900 INJECTION INTRAVENOUS at 16:41

## 2019-07-02 RX ADMIN — Medication SCH ML: at 19:30

## 2019-07-02 RX ADMIN — Medication SCH ML: at 13:00

## 2019-07-02 RX ADMIN — CEFEPIME HYDROCHLORIDE SCH MLS/HR: 1 INJECTION, POWDER, FOR SOLUTION INTRAMUSCULAR; INTRAVENOUS at 05:37

## 2019-07-02 RX ADMIN — METOPROLOL SUCCINATE SCH MG: 25 TABLET, EXTENDED RELEASE ORAL at 07:55

## 2019-07-02 RX ADMIN — SODIUM CHLORIDE SCH MG: 900 INJECTION INTRAVENOUS at 07:54

## 2019-07-02 RX ADMIN — PRAVASTATIN SODIUM SCH MG: 20 TABLET ORAL at 21:16

## 2019-07-02 RX ADMIN — Medication SCH ML: at 01:10

## 2019-07-02 RX ADMIN — LOSARTAN POTASSIUM SCH MG: 25 TABLET, FILM COATED ORAL at 07:54

## 2019-07-02 NOTE — PROGRESS NOTE
DATE:  07/02/2019  

 

SUBJECTIVE:  The patient is an 79-year-old female, who comes with acute mental status

changes.  The patient had an elevated D-dimer.  The patient's CT scan was negative.  So

far, blood culture negative.  The patient is responding to verbal stimuli and talking

and has no cognitive deficits at this time and/or focal deficits.  Neurologically, the

patient is stable; however, failed her swallow evaluation yesterday.  The patient is to

do an MBS today to address the nutrition issue.  Currently asymptomatic.  The patient's

family has been by the bedside and is happy with her progress. 

 

OBJECTIVE:  VITAL SIGNS:  Temperature is 97.4, T-max of 99.1, pulse of 74, respirations

of 22, blood pressure is 154/63, pulse oximetry 100% on 2 L of nasal cannula. 

HEENT:  Normocephalic, atraumatic.  Pupils are reactive to light and accommodation. 

CVS:  S1 and S2 normal.  Regular rate and rhythm.  No facial droop present. 

ABDOMEN:  Nontender, nondistended. 

LUNGS:  Clear to auscultation.

 

MEDICATIONS:  At this time, cefepime q.12 hours, pantoprazole 40 mg twice a day,

lorazepam 0.25 mg q.6 hours as needed, metoprolol 25 mg daily, losartan 50 mg,

lisinopril 2.5, aspirin 81, hydralazine 10 IV q.6 hours, metoprolol 2.5 IV q.6 hours. 

 

LABORATORY VALUES:  Today's white count is 6.45, hemoglobin of 9.1, hematocrit of 27.7.

Chemistries from yesterday BUN of 13, creatinine of 1.07. 

 

ASSESSMENT:  

1. Acute mental status changes.

2. Transient ischemic attack.

3. No cerebrovascular accident noted.

4. Hypertension.

5. History of pacemaker placement.

6. Dementia as per Neurology.

7. Hyperlipidemia.

8. The patient with hypertension.

 

PLAN:  

1. Plan is to get physical therapy to get her out of bed and ambulate her today.

2. Swallow evaluation is planned.

3. Continue on antihypertensive.

4. The patient will restart her medications if MBS is negative.  At this time, we will

continue to monitor the patient.  Additional diagnoses include acute kidney injury,

which has resolved and anemia of uncertain origin.  The patient's cultures have been

negative.  Urine cultures show no growth and blood cultures have no growth either.

Further recommendation per clinical course.  The patient can be transferred out of the

ICU. 

 

 

 

 

______________________________

MD GALEN Dennis/QUINTIN

D:  07/02/2019 06:51:03

T:  07/02/2019 07:37:32

Job #:  [id number]

## 2019-07-02 NOTE — NUR
Received transfer report from ICU RN. Patient arrived on wheelchair and was able to ambulate 
to the bed. Patient is A/Ox4, reported no pain and not in distress. Bed height set low, side 
rails up x2, call light within reach and family member at bedside for the night. Patient had 
right TKA about a month ago by Dr. Mejía and has own walker to ambulate to the toilet. 
Monitor patient for altered mental status.

## 2019-07-02 NOTE — NUR
Received pt at shift change, orders to transfer to [facility] with telemetry. VS WNL, denies pain, 
AAx4, multiple family members at bedside. patient transfered to room 102, report given to 
JAZMIN Polk.

## 2019-07-03 VITALS — SYSTOLIC BLOOD PRESSURE: 168 MMHG | DIASTOLIC BLOOD PRESSURE: 73 MMHG

## 2019-07-03 VITALS — DIASTOLIC BLOOD PRESSURE: 70 MMHG | SYSTOLIC BLOOD PRESSURE: 165 MMHG

## 2019-07-03 VITALS — SYSTOLIC BLOOD PRESSURE: 176 MMHG | DIASTOLIC BLOOD PRESSURE: 80 MMHG

## 2019-07-03 VITALS — SYSTOLIC BLOOD PRESSURE: 138 MMHG | DIASTOLIC BLOOD PRESSURE: 66 MMHG

## 2019-07-03 VITALS — SYSTOLIC BLOOD PRESSURE: 146 MMHG | DIASTOLIC BLOOD PRESSURE: 68 MMHG

## 2019-07-03 VITALS — DIASTOLIC BLOOD PRESSURE: 67 MMHG | SYSTOLIC BLOOD PRESSURE: 167 MMHG

## 2019-07-03 VITALS — DIASTOLIC BLOOD PRESSURE: 80 MMHG | SYSTOLIC BLOOD PRESSURE: 176 MMHG

## 2019-07-03 LAB
ANION GAP SERPL CALC-SCNC: 13.5 MMOL/L (ref 8–16)
BASOPHILS # BLD AUTO: 0.1 10*3/UL (ref 0–0.1)
BASOPHILS NFR BLD AUTO: 0.9 % (ref 0–1)
BUN SERPL-MCNC: 10 MG/DL (ref 7–26)
BUN/CREAT SERPL: 12 (ref 6–25)
CALCIUM SERPL-MCNC: 9.8 MG/DL (ref 8.4–10.2)
CHLORIDE SERPL-SCNC: 106 MMOL/L (ref 98–107)
CO2 SERPL-SCNC: 24 MMOL/L (ref 22–29)
DEPRECATED NEUTROPHILS # BLD AUTO: 3.9 10*3/UL (ref 2.1–6.9)
EGFRCR SERPLBLD CKD-EPI 2021: > 60 ML/MIN (ref 60–?)
EOSINOPHIL # BLD AUTO: 0.2 10*3/UL (ref 0–0.4)
EOSINOPHIL NFR BLD AUTO: 3 % (ref 0–6)
ERYTHROCYTE [DISTWIDTH] IN CORD BLOOD: 13.3 % (ref 11.7–14.4)
GLUCOSE SERPLBLD-MCNC: 112 MG/DL (ref 74–118)
HCT VFR BLD AUTO: 29.1 % (ref 34.2–44.1)
HGB BLD-MCNC: 9.6 G/DL (ref 12–16)
LYMPHOCYTES # BLD: 1.2 10*3/UL (ref 1–3.2)
LYMPHOCYTES NFR BLD AUTO: 20.1 % (ref 18–39.1)
MCH RBC QN AUTO: 29 PG (ref 28–32)
MCHC RBC AUTO-ENTMCNC: 33 G/DL (ref 31–35)
MCV RBC AUTO: 87.9 FL (ref 81–99)
MONOCYTES # BLD AUTO: 0.4 10*3/UL (ref 0.2–0.8)
MONOCYTES NFR BLD AUTO: 7 % (ref 4.4–11.3)
NEUTS SEG NFR BLD AUTO: 68.7 % (ref 38.7–80)
PLATELET # BLD AUTO: 148 X10E3/UL (ref 140–360)
POTASSIUM SERPL-SCNC: 3.5 MMOL/L (ref 3.5–5.1)
RBC # BLD AUTO: 3.31 X10E6/UL (ref 3.6–5.1)
SODIUM SERPL-SCNC: 140 MMOL/L (ref 136–145)

## 2019-07-03 RX ADMIN — ONDANSETRON PRN MG: 4 TABLET, ORALLY DISINTEGRATING ORAL at 10:20

## 2019-07-03 RX ADMIN — CEFEPIME HYDROCHLORIDE SCH MLS/HR: 1 INJECTION, POWDER, FOR SOLUTION INTRAMUSCULAR; INTRAVENOUS at 05:30

## 2019-07-03 RX ADMIN — ONDANSETRON PRN MG: 4 TABLET, ORALLY DISINTEGRATING ORAL at 16:49

## 2019-07-03 RX ADMIN — SODIUM CHLORIDE SCH MG: 900 INJECTION INTRAVENOUS at 08:07

## 2019-07-03 RX ADMIN — PRAVASTATIN SODIUM SCH MG: 20 TABLET ORAL at 21:42

## 2019-07-03 RX ADMIN — Medication SCH ML: at 01:20

## 2019-07-03 RX ADMIN — Medication SCH ML: at 13:30

## 2019-07-03 RX ADMIN — Medication SCH ML: at 19:38

## 2019-07-03 RX ADMIN — CEFEPIME HYDROCHLORIDE SCH MLS/HR: 1 INJECTION, POWDER, FOR SOLUTION INTRAMUSCULAR; INTRAVENOUS at 16:20

## 2019-07-03 RX ADMIN — ASPIRIN 81 MG CHEWABLE TABLET SCH MG: 81 TABLET CHEWABLE at 08:07

## 2019-07-03 RX ADMIN — Medication SCH ML: at 07:00

## 2019-07-03 RX ADMIN — LISINOPRIL SCH MG: 2.5 TABLET ORAL at 08:07

## 2019-07-03 RX ADMIN — METOPROLOL SUCCINATE SCH MG: 25 TABLET, EXTENDED RELEASE ORAL at 08:07

## 2019-07-03 RX ADMIN — PANTOPRAZOLE SODIUM SCH MG: 40 TABLET, DELAYED RELEASE ORAL at 16:49

## 2019-07-03 RX ADMIN — LOSARTAN POTASSIUM SCH MG: 25 TABLET, FILM COATED ORAL at 08:07

## 2019-07-03 NOTE — DIAGNOSTIC IMAGING REPORT
PROCEDURE:X-RAY MODIFIED BARIUM SWALLOW

 

COMPARISON:None.

 

INDICATIONS:Not provided.

 

Fluoroscopy time: 2.6 minutes

DAP:  2.48 Gycm2

 

DISCUSSION:Fluoroscopic examination was performed in conjunction with 

speech pathology, during swallowing of a variety of thin and thick 

liquid consistencies.

 

 

 

CONCLUSION:Laryngeal penetration noted with administration of thin 

consistency. No tracheal aspiration. Please see the report from speech 

pathology for complete details.

 

 

Dictated by:  Kobi Jarrett M.D. on 7/03/2019 at 13:32     

Electronically approved by:  Kobi Jarrett M.D. on 7/03/2019 at 13:32

## 2019-07-03 NOTE — NUR
Assessment done.no resp.distress.iv tube removed and applied pressures dressing.refused to 
start new iv. is aware of that.bed locked and in lowest position.phone and call light 
within reach.instructed to call for assistance as needed.

## 2019-07-03 NOTE — PROGRESS NOTE
DATE:  07/03/2019  

 

SUBJECTIVE:  The patient comes in with acute mental status changes, much better today.

Swallow evaluation of spasm.  The patient is tolerating food.  No complaints and was

able to walk with physical therapy.  No chest pain.  No shortness of breath.  No nausea,

vomiting, or diarrhea.  No constipation.  No rectal bleeding. 

 

OBJECTIVE:  VITAL SIGNS:  Temperature is 98.7, pulse 78, respirations of 18, blood

pressure is 165/70.  The patient is saturating on room air with a pulse ox of 96%. 

HEENT:  Normocephalic, atraumatic.  Pupils are reactive to light and accommodation. 

CVS:  S1, S2 normal.  Regular rate and rhythm. 

ABDOMEN:  Nontender, nondistended. 

EXTREMITIES:  No clubbing, no cyanosis, no edema. 

NEUROLOGIC:  Alert and oriented x3.  No cranial nerve deficits.  No motor deficits.  No

sensory deficits.  The patient is stable. 

 

MICROBIOLOGY:  Urine cultures negative.  Blood cultures negative.

 

ASSESSMENT:  

1. Acute mental status changes.

2. Transient ischemic attack.

3. Hypertension.

4. History of pacemaker placement.

5. History of dementia.

6. Hyperlipidemia.

 

PLAN:  Plan is to continue to monitor the patient.  The patient can be discharged

tomorrow in the morning.  Further recommendation per clinical course.  We will continue

to monitor the patient.  All her home medications were reinstated at this point in time. 

 

 

 

 

______________________________

MD VINNY DennisJ/MODL

D:  07/03/2019 18:08:57

T:  07/03/2019 18:58:40

Job #:  [id number]

## 2019-07-03 NOTE — NUR
RECEIVED PATIENT AWAKE RESTING IN BED NO SIGNS OF DISTRESS. BED LOW, WHEELS LOCKED, SIDE 
RAILS X2. CALL LIGHT IN REACH. WILL CONTINUE TO MONITOR PATIENT.

## 2019-07-03 NOTE — NUR
CM SPOKE TO PATIENT AT BEDSIDE REGARDING HOME HEALTH FOR PT/ OT AS RECOMMENDED BY PT. 
PATIENT REFUSES HOME HEALTH SERVICES. PATIENT STATES SHE WAS GOING TO OUTPATIENT PHYSICAL 
THERAPY AND ONLY HAD TWO SESSIONS LEFT; HOWEVER, SHE HAD NO PLANS TO GO TO THE LAST TWO 
SESSIONS. PATIENT STATES SHE LIVES WITH HER SISTER AND RECEIVES GOOD CARE AT HOME. PATIENT 
GIVEN RISKS OF FALLS AND RISKS IF INJURED BY FALLS. PATIENT STILL REFUSES HOME HEALTH 
SERVICES. PATIENT ADVISED IF SHE CHANGES HER MIND AFTER DISCHARGE TO GO TO PCP AND THEY CAN 
SET UP HOME HEALTH SERVICES AS WELL. PATIENT VERBALLY UNDERSTOOD AND REQUESTS CM CLOSE DOOR 
WHEN LEAVING. CM LEFT CONTACT INFO AND CLOSED DOOR.

## 2019-07-03 NOTE — NUR
NOTIFIED DR. SONG OF PATIENTS IV LEAKING AND REFUSING NEW IV. ORDER TO SWITCH PROTONIX AND 
ZOFRAN TO PO. NEW ORDERS IMPLEMENTED.

## 2019-07-04 VITALS — DIASTOLIC BLOOD PRESSURE: 77 MMHG | SYSTOLIC BLOOD PRESSURE: 186 MMHG

## 2019-07-04 VITALS — SYSTOLIC BLOOD PRESSURE: 163 MMHG | DIASTOLIC BLOOD PRESSURE: 70 MMHG

## 2019-07-04 VITALS — DIASTOLIC BLOOD PRESSURE: 81 MMHG | SYSTOLIC BLOOD PRESSURE: 181 MMHG

## 2019-07-04 LAB
ANION GAP SERPL CALC-SCNC: 14.4 MMOL/L (ref 8–16)
BASOPHILS # BLD AUTO: 0 10*3/UL (ref 0–0.1)
BASOPHILS NFR BLD AUTO: 0.6 % (ref 0–1)
BUN SERPL-MCNC: 9 MG/DL (ref 7–26)
BUN/CREAT SERPL: 11 (ref 6–25)
CALCIUM SERPL-MCNC: 9.7 MG/DL (ref 8.4–10.2)
CHLORIDE SERPL-SCNC: 106 MMOL/L (ref 98–107)
CO2 SERPL-SCNC: 24 MMOL/L (ref 22–29)
DEPRECATED NEUTROPHILS # BLD AUTO: 4.5 10*3/UL (ref 2.1–6.9)
EGFRCR SERPLBLD CKD-EPI 2021: > 60 ML/MIN (ref 60–?)
EOSINOPHIL # BLD AUTO: 0.1 10*3/UL (ref 0–0.4)
EOSINOPHIL NFR BLD AUTO: 2.1 % (ref 0–6)
ERYTHROCYTE [DISTWIDTH] IN CORD BLOOD: 13.3 % (ref 11.7–14.4)
GLUCOSE SERPLBLD-MCNC: 107 MG/DL (ref 74–118)
HCT VFR BLD AUTO: 30.5 % (ref 34.2–44.1)
HGB BLD-MCNC: 9.8 G/DL (ref 12–16)
LYMPHOCYTES # BLD: 1.2 10*3/UL (ref 1–3.2)
LYMPHOCYTES NFR BLD AUTO: 18.6 % (ref 18–39.1)
MCH RBC QN AUTO: 28.6 PG (ref 28–32)
MCHC RBC AUTO-ENTMCNC: 32.1 G/DL (ref 31–35)
MCV RBC AUTO: 88.9 FL (ref 81–99)
MONOCYTES # BLD AUTO: 0.4 10*3/UL (ref 0.2–0.8)
MONOCYTES NFR BLD AUTO: 6.7 % (ref 4.4–11.3)
NEUTS SEG NFR BLD AUTO: 71.4 % (ref 38.7–80)
PLATELET # BLD AUTO: 154 X10E3/UL (ref 140–360)
POTASSIUM SERPL-SCNC: 3.4 MMOL/L (ref 3.5–5.1)
RBC # BLD AUTO: 3.43 X10E6/UL (ref 3.6–5.1)
SODIUM SERPL-SCNC: 141 MMOL/L (ref 136–145)

## 2019-07-04 RX ADMIN — CEFEPIME HYDROCHLORIDE SCH MLS/HR: 1 INJECTION, POWDER, FOR SOLUTION INTRAMUSCULAR; INTRAVENOUS at 05:39

## 2019-07-04 RX ADMIN — ONDANSETRON PRN MG: 4 TABLET, ORALLY DISINTEGRATING ORAL at 02:18

## 2019-07-04 RX ADMIN — LOSARTAN POTASSIUM SCH MG: 25 TABLET, FILM COATED ORAL at 06:00

## 2019-07-04 RX ADMIN — ASPIRIN 81 MG CHEWABLE TABLET SCH MG: 81 TABLET CHEWABLE at 08:00

## 2019-07-04 RX ADMIN — METOPROLOL SUCCINATE SCH MG: 25 TABLET, EXTENDED RELEASE ORAL at 08:01

## 2019-07-04 RX ADMIN — Medication SCH ML: at 01:00

## 2019-07-04 RX ADMIN — LISINOPRIL SCH MG: 2.5 TABLET ORAL at 08:00

## 2019-07-04 RX ADMIN — Medication SCH ML: at 07:19

## 2019-07-04 RX ADMIN — PANTOPRAZOLE SODIUM SCH MG: 40 TABLET, DELAYED RELEASE ORAL at 08:00

## 2019-07-04 NOTE — NUR
PATIENT DISCHARGED FROM FACILITY. PATIENT GATHERED ALL PERSONAL BELONGINGS AND DISCHARGE 
INSTRUCTIONS. LEFT UNIT IN WHEELCHAIR AND WENT HOME VIA PRIVATE AUTO. NO SIGNS OF DISTRESS. 
WHEN LEAVING FACILITY.

## 2019-07-04 NOTE — NUR
RECEIVED PATIENT AWAKE RESTING IN BED. NO SIGNS OF DISTRESS. BED LOW, WHEELS LOCKED, SIDE 
RAILS X2. CALL LIGHT IN REACH WILL CONTINUE TO MONITOR PATIENT.

## 2019-07-04 NOTE — PROGRESS NOTE
DATE:  07/04/2019  

 

SUBJECTIVE:  The patient is here for acute mental status changes.  CT scan, CTA, and

echo have been all normal.  The patient's cognitive status has improved.  No chest pain.

 No shortness of breath.  No nausea, vomiting, or diarrhea.  The patient is getting her

medications. 

 

MEDICATIONS:  Include albuterol, aspirin, cefepime, hydralazine, lisinopril, lorazepam,

losartan, metoprolol, and pantoprazole. 

 

OBJECTIVE:  VITAL SIGNS:  Temperature is 97.5, afebrile for the last 48 hours; blood

pressure is 186/77, and pulse oximetry of 97%. 

HEENT:  Normocephalic and atraumatic.  Pupils are reactive to light and accommodation. 

CVS:  S1 and S2, irregular. 

ABDOMEN:  Nontender and nondistended. 

EXTREMITIES:  No clubbing, no cyanosis, and no edema.

 

LABORATORY DATA:  The patient's white count today was 6.29, hemoglobin of 9.8, and

hematocrit 30.5.  Chemistry; sodium of 141, potassium 3.4, BUN of 9, creatinine of 0.85.

 TSH is normal, is 1.39. 

 

MICROBIOLOGY:  Urine cultures, no growth.  Blood cultures, no growth x2.

 

ASSESSMENT:  Acute mental status changes, toxic versus metabolic encephalopathy;

transient ischemic attack, hypertension, history of pacemaker placement, and history of

hyperlipidemia. 

 

PLAN:  Continue treatment, can be discharged home.  Restart her home medications.  The

patient will be seen by me in about a week's time, also by Dr. Perez in about a week's

time.  Further recommendation per clinical course.  The patient can be discharged home

today. 

 

 

 

 

______________________________

MD GALEN Dennis/EMILIAL

D:  07/04/2019 07:30:34

T:  07/04/2019 08:10:25

Job #:  [id number]

## 2019-09-22 NOTE — DISCHARGE SUMMARY
HOSPITAL COURSE:  The patient was brought to the hospital for acute mental status

changes.  CT scan was done.  CTA was done.  Echo was done.  The patient showed all

normal activity.  The patient's cognitive status improved just with hydration and

waiting.  The patient had no nausea, vomiting, or diarrhea during the entire stay.  The

patient was restarted on home medication including lorazepam.  A possible etiology could

be medicine-induced encephalopathy.  Dr. Hahn was consulted.  No stroke was

diagnosed.  The patient was feeling better.  The patient discharged home. 

 

FINAL DIAGNOSES:  

1. Acute mental status changes.

2. Toxic versus metabolic encephalopathy.

3. Transient ischemic attack.

4. Hypertension.

5. History of pacemaker placement.

6. History of hyperlipidemia.

 

PLAN:  Continue with current medication.  Follow up with Dr. Perez.  Follow up with Dr. Hahn and me in about a week's time.  Further recommendation as per clinical course.

The 

patient will be followed up with me and consult on a later date.  For further

information, look in the chart. 

 

 

 

 

______________________________

MD GALEN Dennis/MODL

D:  09/21/2019 07:26:44

T:  09/22/2019 03:21:19

Job #:  [id number]

## 2020-10-13 NOTE — NUR
DISCUSSED IN BARRIER ROUNDS, PT POSSIBLE TIA, NEG CULTURES, MBS TODAY; NO ASPIRATION, NMES, 
PT TO EVALUATE, PT HAS MED SURG ORDERS. Encounter Date: 10/12/2020    SCRIBE #1 NOTE: I, Elaine Lira, am scribing for, and in the presence of,  Zenon Trujillo MD. I have scribed the entire note.       History     Chief Complaint   Patient presents with    abnormal lab     pt present to the ED c/o abnormal labs. the pt PCP called the patient with abnormal labs results and told them to come to the ED for a blood transfusion.     Weakness     This is a 73 y/o male with a PMHx of Diabetes mellitus and Prostate cancer, presenting to the ED with a CC of abnormal labs.  Patient had outpatient blood work with Hematology that revealed a hemoglobin is 5.6. Patient complains of generalized weakness and fatigue.  Denies chest pain or shortness of breath. He denies any nausea, vomiting, black/bloody stool, or other sources of bleeding. NKDA    The history is provided by the patient.     Review of patient's allergies indicates:  No Known Allergies  Past Medical History:   Diagnosis Date    DMII (diabetes mellitus, type 2)     Neuropathy due to chemotherapeutic drug     Prostate cancer      Past Surgical History:   Procedure Laterality Date    PROSTATECTOMY       History reviewed. No pertinent family history.  Social History     Tobacco Use    Smoking status: Current Every Day Smoker     Packs/day: 0.50     Years: 50.00     Pack years: 25.00     Types: Cigarettes    Smokeless tobacco: Never Used   Substance Use Topics    Alcohol use: Yes     Comment: on occasion    Drug use: Never     Review of Systems   Constitutional: Positive for fatigue. Negative for chills, diaphoresis and fever.   HENT: Negative for congestion, sinus pain and sore throat.    Respiratory: Negative for cough, shortness of breath, wheezing and stridor.    Cardiovascular: Negative for chest pain, palpitations and leg swelling.   Gastrointestinal: Negative for abdominal pain, diarrhea, nausea and vomiting.   Genitourinary: Negative for dysuria, flank pain and frequency.    Musculoskeletal: Negative for back pain and joint swelling.   Neurological: Positive for weakness (Generalized). Negative for dizziness, syncope, facial asymmetry, speech difficulty, numbness and headaches.   Psychiatric/Behavioral: Negative for confusion.       Physical Exam     Initial Vitals [10/12/20 2119]   BP Pulse Resp Temp SpO2   (!) 97/52 93 16 98.2 °F (36.8 °C) 100 %      MAP       --         Physical Exam    Nursing note and vitals reviewed.  Constitutional: He is not diaphoretic. No distress.   HENT:   Head: Normocephalic and atraumatic.   Mouth/Throat: Oropharynx is clear and moist.   Eyes: EOM are normal. No scleral icterus.   Conjunctival pallor   Neck: Normal range of motion. Neck supple. No JVD present.   Cardiovascular: Normal rate, regular rhythm and intact distal pulses.   Pulmonary/Chest: Breath sounds normal. No stridor. No respiratory distress.   Abdominal: Soft. Bowel sounds are normal. He exhibits no distension. There is no abdominal tenderness.   Musculoskeletal: Normal range of motion. No tenderness or edema.   Neurological: He is alert. He has normal strength. No cranial nerve deficit or sensory deficit. GCS score is 15. GCS eye subscore is 4. GCS verbal subscore is 5. GCS motor subscore is 6.   Skin: Skin is warm and dry. No rash noted.   Psychiatric: He has a normal mood and affect.         ED Course   Procedures  Labs Reviewed   CBC W/ AUTO DIFFERENTIAL - Abnormal; Notable for the following components:       Result Value    RBC 1.92 (*)     Hemoglobin 5.3 (*)     Hematocrit 17.8 (*)     Mean Corpuscular Hemoglobin Conc 29.8 (*)     RDW 19.9 (*)     MPV 8.8 (*)     Immature Granulocytes 2.1 (*)     Immature Grans (Abs) 0.11 (*)     nRBC 1 (*)     All other components within normal limits    Narrative:        Hgb and Hct  critical result(s) called and verbal readback   obtained from [facility] by MELINA 10/12/2020 22:17   COMPREHENSIVE METABOLIC PANEL - Abnormal; Notable for the  following components:    Glucose 155 (*)     Calcium 8.4 (*)     Albumin 2.3 (*)     Alkaline Phosphatase 231 (*)     All other components within normal limits   APTT - Abnormal; Notable for the following components:    aPTT 34.3 (*)     All other components within normal limits    Narrative:     Recoll. [id number] by [facility] at 10/12/2020 22:19, reason: Blue top   overrfilled called to [facility] Rodriguezoin 10/12/2020  22:19   IRON AND TIBC - Abnormal; Notable for the following components:    Transferrin 114 (*)     TIBC 169 (*)     All other components within normal limits   PROTIME-INR    Narrative:     Recoll. [id number] by B2 at 10/12/2020 22:19, reason: Blue top   overrfilled called to [facility] Rodriguezoin 10/12/2020  22:19   RETICULOCYTES   LACTATE DEHYDROGENASE   TSH   SARS-COV-2 RDRP GENE   TYPE & SCREEN          Imaging Results    None          Medical Decision Making:   History:   Old Medical Records: I decided to obtain old medical records.  Differential Diagnosis:   Differential diagnosis includes, but is not limited to; anemia, iron deficiency, and acute blood loss anemia  Clinical Tests:   Lab Tests: Ordered and Reviewed  ED Management:  Patient is afebrile and not toxic appearing at time history and physical.  Repeat CBC confirms anemia.  Patient denies black or bloody bowel movements.  He has been ordered for blood transfusion.     Consult: I have spoken with Dr. Alas from the Hematology service regarding the patient's presentation and study results.  At this time, the recommendation is iron, TIBC, reticuloctyes, haptoglobin, LDH, TSH, B12, folate.    Patient informed of findings and plan of care.  He is to be admitted to Hospital Medicine for blood transfusion, hematology evaluation and further evaluation.    Please put in 30 minutes of critical care due to patient having a high risk of circulatory failure.   Separate from teaching and exclusive of procedure and ekg time  Includes:  Time at  bedside  Time reviewing test results  Time discussing case with staff  Time documenting the medical record  Time spent with family members  Time spent with consults  Management   This chart was completed using dictation software, as a result there may be some transcription errors.            Scribe Attestation:   Scribe #1: I performed the above scribed service and the documentation accurately describes the services I performed. I attest to the accuracy of the note.                      Clinical Impression:     ICD-10-CM ICD-9-CM   1. Anemia  D64.9 285.9   2. Symptomatic anemia  D64.9 285.9                      Disposition:   Disposition: Admitted  Condition: Fair     ED Disposition Condition    Admit        I, Zenon Trujillo , personally performed the services described in this documentation. All medical record entries made by the scribe were at my direction and in my presence.  I have reviewed the chart and agree that the record reflects my personal performance and is accurate and complete.                        Zenon Trujillo MD  10/14/20 [id number]

## 2021-04-22 ENCOUNTER — HOSPITAL ENCOUNTER (OUTPATIENT)
Dept: HOSPITAL 88 - PT | Age: 81
LOS: 8 days | End: 2021-04-30
Attending: FAMILY MEDICINE
Payer: MEDICARE

## 2021-04-22 DIAGNOSIS — M53.86: ICD-10-CM

## 2021-04-22 DIAGNOSIS — R53.81: ICD-10-CM

## 2021-04-22 DIAGNOSIS — M62.81: ICD-10-CM

## 2021-04-22 DIAGNOSIS — M54.16: Primary | ICD-10-CM

## 2024-08-21 ENCOUNTER — HOSPITAL ENCOUNTER (OUTPATIENT)
Dept: HOSPITAL 88 - RAD | Age: 84
End: 2024-08-21
Attending: PHYSICIAN ASSISTANT
Payer: MEDICARE

## 2024-08-21 DIAGNOSIS — M25.551: Primary | ICD-10-CM

## 2024-08-21 DIAGNOSIS — M54.50: ICD-10-CM

## 2024-08-21 DIAGNOSIS — M15.0: ICD-10-CM

## 2024-08-21 DIAGNOSIS — M25.552: ICD-10-CM

## 2024-08-21 DIAGNOSIS — M25.561: ICD-10-CM

## 2024-08-21 PROCEDURE — 73521 X-RAY EXAM HIPS BI 2 VIEWS: CPT

## 2024-08-21 PROCEDURE — 72100 X-RAY EXAM L-S SPINE 2/3 VWS: CPT

## 2025-01-21 NOTE — XMS REPORT
Continuity of Care Document

                             Created on: 2018



GISELLE DELGADO

External Reference #: [id number]

: 1940

Sex: Female



Demographics







                          Address                   [address] [place]  [address]

 

                          Home Phone                [phone]

 

                          Preferred Language        Unknown

 

                          Marital Status            Unknown

 

                          Cheondoism Affiliation     Unknown

 

                          Race                      Unknown

 

                          Ethnic Group              Unknown





Author







                          Author                    [facility]              Interface

 

                          Address                   Unknown

 

                          Phone                     Unavailable



                                                    



Problems

                    





                    Problem                            Status                            Onset Date     

                          Classification                            Date Reported       

                          Comments                            Source                    

 

                          R94.01 - ABNORMAL ELECTROENCEPHALOGRAM                            Active          

                    2017                                                           

                                                       [facility] [place]                

    

 

                          V76.12 - SCREEN MAMMOGRA                            Active                        

                    2012                                                                         

                                                       [facility] [place]                    

 

                    ROUTINE                            Active                            11/10/2011     

                                                                                       

                                        [facility]                    

 

                    Vitamin D deficiency                            Active                              

                          Problem                            12/15/2018              

                                                      [facility]                    

 

                          Primary osteoarthritis involving multiple joints                            Active

                                                        Problem                      

                    12/15/2018                                                        Raymond Wilson 

                   

 

                    Pain in right knee                            Active                                

                          Problem                            12/15/2018                

                                                      Raymond Wilson                    

 

                    SCREEN MAMMOGRAM NEC                            Active                              

                                                                                       

                                        [facility]                    



                                                                                
                                                                                
                      



Medications

                    





                    Medication                            Details                            Route      

                          Status                            Patient Instructions         

                          Ordering Provider                            Order Date           

                                        Source                    

 

                    Centrum                            as directed                            Orally    

                          Active                            - Orally                   

                    Steve Wilson  

                  

 

                          Osteo Bi-Flex Adv Double St                            as directed                

                    Orally                            Active                            - Orally

                            Steve Wilson                    

 

                    Losartan Potassium                            1 tablet                            Orally

                            Active                            50 MG Orally Once a day

                            [facility]                                                   

                                        Raymond Wilson                    

 

                    Pantoprazole Sodium                            1 tablet                            Orally

                            Active                            40 MG Orally Once a day

                            [facility]                                                   

                                        Raymond Wilson                    

 

                          Tylenol Extra Strength                            1 tablet as needed              

                    Orally                            Active                            500 MG

 Orally every 6 hrs                            [facility]                                

                                        Raymond Wilson                    

 

                    Lisinopril                            1 tablet                            Orally    

                          Active                            2.5 MG Orally Once a day   

                         [facility]                                                        

Raymond Wilson                    

 

                          Metoprolol Tartrate                            1 tablet with food                 

                    Orally                            Active                            25 MG Orally

 Twice a day                            [facility]                                       

                                        Raymond Wilson                    

 

                    Pravastatin Sodium                            1 tablet                            Orally

                            Active                            10 MG Orally Once a day

                            [facility]                                                   

                                        Raymond Wilson                    



                                                                                
                                                                                
                                      



Allergies, Adverse Reactions, Alerts

                    





                    Substance                            Category                            Reaction   

                          Severity                            Reaction type           

                          Status                            Date Reported                     

                          Comments                            Source                    

 

                    penicillin                            Adverse Reaction                            Info

 Not Available                                                        Adverse Reaction

                            Active                            2018             

                                                      Raymond Wilson                    



                                                                        



Immunizations

                    





                    Immunization                            Date Given                            Site  

                          Status                            Last Updated             

                          Comments                            Source                    



                                                                        



Results

                    





                    Order Name                            Results                            Value      

                          Reference Range                            Date                

                          Interpretation                            Comments                       

                                        Source                    

 

                          Chest w/wo contrast CT                            Chest w/wo contrast CT          

                                        Exam: CT scan of the chest with and without contrast



Reason for Exam: Abnormal echocardiogram



Comparison Exam: None available



Technique: Multiple axial images were obtained of the chest. 3.75 mm slices were
acquired with and without injection of 100 cc Omnipaque IV. In addition, 
reformatted sagittal and coronal images were obtained for additional diagnostic 
information.  Total exam OLO=968 mGy-cm.



Discussion:



Cardiac pacemaker device is noted. Visualized portions of the thyroid gland are 
unremarkable. No mediastinal, hilar, or axillary lymphadenopathy. The heart size
is within normal limits. No pericardial or pleural effusions. The central 
airways are patent.  Central airways are patent. No interstitial thickening or 
bronchiectasis. No focal lung consolidations. Note is made of multiple 
subcentimeter pulmonary nodules. The largest is seen within the superior segment
of the right upper lobe measuring 9 mm (series 4, 43). Others are seen within 
the minor fissure (4, 47) and left major fissure (series 4, 44).



The visualized portions of the adrenal glands are within normal limits. Cyst 
seen within the liver measuring 6.1 cm. Partially seen cyst within the superior 
pole of the right kidney. No evidence for thoracic aortic aneurysm or 
dissection.



Impression:



                                        1.  The heart and mediastinum are unremarkable in appearance. No evidence for thoracic

 aortic aneurysm.



                                        2. Multiple subcentimeter pulmonary nodules as detailed above. Correlate with patient's

 clinical history and consider short-term follow-up exam.



                                                          03/15/2017                 

                                                      -

                                        -





Read by:  Abdoul Recio MD

Dictated Date/time:  03/15/17 11:57

Electronically Signed by:  Abdoul Recio MD                   03/15/17 
12:13

FINAL REPORT

                                        [facility]                    

 

                          Spine lumbar minimum 4 views                            Spine lumbar minimum 4 views

                                        LUMBAR SPINE SERIES

 

CLINICAL HISTORY:

Low back pain.

 

COMPARISON IMAGING:

None.

 

FINDINGS:

Five views of the lumbar spine were obtained. Moderate multilevel degenerative 
changes are present, including loss of disc height, endplate sclerosis, marginal
osteophytes, and facet hypertrophy. Multiple neural foramina appear narrowed. 
There is approximately 14 mm of grade 2 anterolisthesis at L4-L5. No pars defect
is seen, suggesting facet arthrosis as the etiology of this finding. Vertebral 
body heights and alignment are otherwise maintained. No acute fracture is seen. 
Soft tissues are grossly unremarkable.

 

IMPRESSION:

Moderate multilevel degenerative changes with grade 2 anterolisthesis at L4-L5.

                                                          07/10/2014                 

                                                      -

                                        -





Read by:  Marcio Juarez MD

Dictated Date/time:  07/10/14 13:32

Electronically Signed by:  Marcio Juarez MD                      07/10/14 
13:33

FINAL REPORT

                                        [facility]                    

 

                          Hip min 2 views                            Hip min 2 views                        

                                        LEFT HIP SERIES

 

CLINICAL HISTORY:

Hip pain.

 

COMPARISON IMAGING:

None.

 

FINDINGS:

Two views were submitted for evaluation. Moderate to severe loss of joint space,
large marginal osteophytes, and mild subchondral sclerosis at the left hip 
indicate moderate to severe osteoarthritis. No fracture, dislocation, or 
suspicious radiopaque foreign body is seen. Soft tissues are unremarkable.

 

IMPRESSION:

Moderate to severe osteoarthritis.

                                                          07/10/2014                 

                                                      -

                                        -





Read by:  Marcio Juarez MD

Dictated Date/time:  07/10/14 13:35

Electronically Signed by:  Marcio Juarez MD                      07/10/14 
13:36

FINAL REPORT

                                        [facility]                    

 

                          Knee AP and lateral                            Knee AP and lateral                

                                        RIGHT KNEE SERIES

 

CLINICAL HISTORY:  

Knee pain.

 

COMPARISON IMAGING: 

None.

 

FINDINGS: 

                                        2 views of the right knee are submitted for interpretation. Moderate to severe osteoarthritis

 is present, most prominent in the medial compartment. These changes include 
loss of joint space, subchondral sclerosis, and marginal osteophytes. There is 
no fracture. A large joint effusion is seen. Soft tissues are unremarkable.

 

IMPRESSION:

Moderate to severe osteoarthritis with a large joint effusion.

                                                          07/10/2014                 

                                                      -

                                        -





Read by:  Marcio Juarez MD

Dictated Date/time:  07/10/14 13:34

Electronically Signed by:  Marcio Juarez MD                      07/10/14 
13:35

FINAL REPORT

                                        [facility]                    

 

                          Spine lumbar wo contrast MRI                            Spine lumbar wo contrast MRI

                                        MRI LUMBAR SPINE WITHOUT CONTRAST 2013

 

CLINICAL: Bilateral lumbar radiculopathy. 

 

TECHNIQUE: Sagittal T1, sagittal T2 with fat saturation, axial T1 and axial T2 
images were obtained. No intravenous gadolinium was given.

 

FINDINGS: 

 

The paravertebral soft tissues are normal. 

 

The conus medullaris terminates at the L1-L2 level. Multilevel lower thoracic 
spine degenerative changes are seen, with T10-T11 and T11-T12 disc bulges 
resulting in mild central canal stenosis.

 

No mass effect on the conus medullaris is present.

 

L1-L2: Mild disc bulge is present with minimal central canal stenosis. No 
foraminal stenosis is seen.

 

L2-L3: Mild to moderate disc bulge is present, measuring 3.9 mm in the AP 
dimension. Moderate facet osteoarthritis and ligamenta flava redundancy are seen
with mild to moderate central canal stenosis. No significant foraminal stenosis 
identified.

 

L3-L4: Mild disc bulge is present with mild central canal stenosis. No 
significant foraminal stenosis is seen. L4 superior endplate Schmorl's node is 
present.

 

L4-L5: Grade 1 anterolisthesis is present with disc bulge. Severe bilateral 
facet osteoarthritis is present. Significant ligamenta flava redundancy is seen.
Severe central canal stenosis is present. The lateral recesses are severely 
stenotic with impingement of the bilateral L5 descending nerve roots. Severe 
right foraminal stenosis and moderate to severe left foraminal stenosis are 
present with corresponding mass effect on the bilateral L4 exiting nerve root 
sleeves.

 

L5-S1: Disc bulge is present with mild thecal sac stenosis. Bilateral foraminal 
and extraforaminal disc osteophyte complexes are present, with moderate left 
foraminal stenosis and mild right foraminal stenosis. Mild mass effect on the 
bilateral L5 exiting nerve root sleeves is seen.

 

 

 

IMPRESSION:

                                        1. Multilevel disc degenerative disease and spondylosis.

                                        2. L2-L3 mild to moderate central canal stenosis.

                                        3. L4-L5 severe central canal stenosis an lateral recess stenosis with impingement

 of the bilateral L5 descending nerve roots. Grade 1 anterolisthesis. Moderate 
to severe bilateral foraminal stenosis as above.

                                        4. L5-S1 moderate left foraminal stenosis and mild right foraminal stenosis as above.



                                        5. Please see additional comments above.

 

                                                          2013                 

                                                      -

                                        -





Read by:  Roger Call

Dictated Date/time:  13 10:57

Electronically Signed by:  Roger Call MD         13 
11:44

FINAL REPORT

                                         [facility]                    



                                                                                
                                                                               



Vital Signs

                    





                    Vital Sign                            Value                            Date         

                          Comments                            Source                    

 

                    Weight                            151.6                             2018      

                                                      [facility]                    

 

                    Height                            65                             2018         

                                                      [facility]                    

 

                    Heart Rate                            74                             2018     

                                                      Ray[facility] Wilson                    



 

                    Diastolic (mm Hg)                            80                             2018

                                                        Ray[facility]               

     

 

                    Systolic (mm Hg)                            130                             2018

                                                        Raymond Wilson               

     



                                                                                
                                                                       



Encounters

                    





                    Location                            Location Details                            Encounter

 Type                            Encounter Number                            Reason For

 Visit                            Attending Provider                            ADM Date

                            DC Date                            Status                

                                        Source                    

 

                    [facility]                                                        Outpatient      

                          [id number]                            ROUTINE                

                    [place]                             2011                              

                          Active                            [facility]             

       

 

                                                                        OD                            

[id number]                            V76.12 - SCREEN MAMMOGRA                   

                    [place]                             2013

                            Active                             [facility]       

             

 

                          [facility] [place]                                                

                          Outpt Diag Services                            [id number]                  

                                                Juan Hernandez                             07/10/2014

                            2014                                               

                                         [facility] [place]                    

 

                          [facility] [place]                                                

                          Outpt Diag Services                            [id number]                  

                                                Coy Perez                             03/15/2017

                            2017                                               

                                         [facility] [place]                    



                                                                                
                                       



Procedures

                    





                    Procedure                            Code                            Date           

                          Perfomer                            Comments                        

                                        Source FAMILY HISTORY:  FH: diabetes mellitus, mother